# Patient Record
Sex: MALE | Race: BLACK OR AFRICAN AMERICAN | NOT HISPANIC OR LATINO | Employment: OTHER | URBAN - METROPOLITAN AREA
[De-identification: names, ages, dates, MRNs, and addresses within clinical notes are randomized per-mention and may not be internally consistent; named-entity substitution may affect disease eponyms.]

---

## 2017-05-14 ENCOUNTER — HOSPITAL ENCOUNTER (EMERGENCY)
Facility: CLINIC | Age: 37
Discharge: HOME | End: 2017-05-14
Attending: FAMILY MEDICINE | Admitting: FAMILY MEDICINE
Payer: COMMERCIAL

## 2017-05-14 VITALS
OXYGEN SATURATION: 97 % | TEMPERATURE: 98.4 F | SYSTOLIC BLOOD PRESSURE: 128 MMHG | RESPIRATION RATE: 16 BRPM | DIASTOLIC BLOOD PRESSURE: 87 MMHG

## 2017-05-14 DIAGNOSIS — K08.89 ODONTALGIA: ICD-10-CM

## 2017-05-14 PROCEDURE — 99283 EMERGENCY DEPT VISIT LOW MDM: CPT | Mod: Z6 | Performed by: FAMILY MEDICINE

## 2017-05-14 PROCEDURE — 99283 EMERGENCY DEPT VISIT LOW MDM: CPT | Performed by: FAMILY MEDICINE

## 2017-05-14 PROCEDURE — 25000132 ZZH RX MED GY IP 250 OP 250 PS 637: Performed by: FAMILY MEDICINE

## 2017-05-14 RX ORDER — IBUPROFEN 800 MG/1
800 TABLET, FILM COATED ORAL EVERY 8 HOURS PRN
Qty: 20 TABLET | Refills: 0 | Status: SHIPPED | OUTPATIENT
Start: 2017-05-14 | End: 2017-05-22

## 2017-05-14 RX ORDER — PENICILLIN V POTASSIUM 500 MG/1
500 TABLET, FILM COATED ORAL 4 TIMES DAILY
Qty: 28 TABLET | Refills: 0 | Status: SHIPPED | OUTPATIENT
Start: 2017-05-14 | End: 2017-05-21

## 2017-05-14 RX ORDER — OXYCODONE AND ACETAMINOPHEN 5; 325 MG/1; MG/1
2 TABLET ORAL ONCE
Status: COMPLETED | OUTPATIENT
Start: 2017-05-14 | End: 2017-05-14

## 2017-05-14 RX ADMIN — OXYCODONE HYDROCHLORIDE AND ACETAMINOPHEN 1 TABLET: 5; 325 TABLET ORAL at 11:41

## 2017-05-14 ASSESSMENT — ENCOUNTER SYMPTOMS
ABDOMINAL PAIN: 0
CHILLS: 0
FEVER: 0
SHORTNESS OF BREATH: 0

## 2017-05-14 NOTE — ED AVS SNAPSHOT
[facility], Emergency Department    [address]    [facility] [address]    Phone:  [phone]    Fax:  [phone]                                       Casi Villatoro   MRN: [id number]    Department:  [facility], Emergency Department   Date of Visit:  5/14/2017           After Visit Summary Signature Page     I have received my discharge instructions, and my questions have been answered. I have discussed any challenges I see with this plan with the nurse or doctor.    ..........................................................................................................................................  Patient/Patient Representative Signature      ..........................................................................................................................................  Patient Representative Print Name and Relationship to Patient    ..................................................               ................................................  Date                                            Time    ..........................................................................................................................................  Reviewed by Signature/Title    ...................................................              ..............................................  Date                                                            Time

## 2017-05-14 NOTE — ED AVS SNAPSHOT
[facility], Emergency Department    [address]    [place] [address]    Phone:  [phone]    Fax:  [phone]                                       Casi Villatoro   MRN: [id number]    Department:  [facility], Emergency Department   Date of Visit:  5/14/2017           Patient Information     Date Of Birth          1980        Your diagnoses for this visit were:     Odontalgia        You were seen by Juanpablo Devries MD.        Discharge Instructions       Thank you for choosing [facility].     Please closely monitor for further symptoms. Return to the Emergency Department if you develop any new or worsening signs or symptoms.    If you received any opiate pain medications or sedatives during your visit, please do not drive for at least 8 hours.     Labs, cultures or final xray interpretations may still need to be reviewed.  We will call you if your plan of care needs to be changed.    Please make an appointment to follow up with [facility], [phone] or [facility] (phone: [phone]) in 1-2 days even if entirely better.      Discharge References/Attachments     DENTAL PAIN (ENGLISH)      24 Hour Appointment Hotline       To make an appointment at any [facility] clinic, call [phone] ([phone]). If you don't have a family doctor or clinic, we will help you find one. [facility] clinics are conveniently located to serve the needs of you and your family.             Review of your medicines      START taking        Dose / Directions Last dose taken    ibuprofen 800 MG tablet   Commonly known as:  ADVIL/MOTRIN   Dose:  800 mg   Quantity:  20 tablet        Take 1 tablet (800 mg) by mouth every 8 hours as needed for moderate pain   Refills:  0        penicillin V potassium 500 MG tablet   Commonly known as:  VEETID   Dose:  500 mg   Quantity:  28 tablet        Take 1 tablet (500 mg) by mouth 4 times daily for 7 days   Refills:  0         "  Our records show that you are taking the medicines listed below. If these are incorrect, please call your family doctor or clinic.        Dose / Directions Last dose taken    NO ACTIVE MEDICATIONS        Refills:  0                Prescriptions were sent or printed at these locations (2 Prescriptions)                   Other Prescriptions                Printed at Department/Unit printer (2 of 2)         ibuprofen (ADVIL/MOTRIN) 800 MG tablet               penicillin V potassium (VEETID) 500 MG tablet                Orders Needing Specimen Collection     None      Pending Results     No orders found from 5/12/2017 to 5/15/2017.            Pending Culture Results     No orders found from 5/12/2017 to 5/15/2017.            Pending Results Instructions     If you had any lab results that were not finalized at the time of your Discharge, you can call the ED Lab Result RN at [phone]. You will be contacted by this team for any positive Lab results or changes in treatment. The nurses are available 7 days a week from 10A to 6:30P.  You can leave a message 24 hours per day and they will return your call.        Thank you for choosing [facility]       Thank you for choosing [facility] for your care. Our goal is always to provide you with excellent care. Hearing back from our patients is one way we can continue to improve our services. Please take a few minutes to complete the written survey that you may receive in the mail after you visit with us. Thank you!        EZMovehart Information     NxThera lets you send messages to your doctor, view your test results, renew your prescriptions, schedule appointments and more. To sign up, go to [email or web address] . Click on \"Log in\" on the left side of the screen, which will take you to the Welcome page. Then click on \"Sign up Now\" on the right side of the page.     You will be asked to enter the access code listed below, as well as some personal information. Please follow the " directions to create your username and password.     Your access code is: [email or web address]  Expires: 2017 11:45 AM     Your access code will  in 90 days. If you need help or a new code, please call your [facility] clinic or [phone].        Care EveryWhere ID     This is your Care EveryWhere ID. This could be used by other organizations to access your [facility] medical records  [id number]        After Visit Summary       This is your record. Keep this with you and show to your community pharmacist(s) and doctor(s) at your next visit.

## 2017-05-14 NOTE — DISCHARGE INSTRUCTIONS
Thank you for choosing [facility].     Please closely monitor for further symptoms. Return to the Emergency Department if you develop any new or worsening signs or symptoms.    If you received any opiate pain medications or sedatives during your visit, please do not drive for at least 8 hours.     Labs, cultures or final xray interpretations may still need to be reviewed.  We will call you if your plan of care needs to be changed.    Please make an appointment to follow up with [facility], [phone] or [facility] (phone: [phone]) in 1-2 days even if entirely better.

## 2017-05-14 NOTE — ED PROVIDER NOTES
History     Chief Complaint   Patient presents with     Dental Pain     Pain in right lower molar for one week.     TYLER Villatoro is a 37 year old male who presents to the ED with complaints of lower right-sided posterior tooth pain. The patient states that this has hurt for about a year, but over the past week this has become much worse. He denies any fever, chills, or trauma to the area.      PAST MEDICAL HISTORY:   Past Medical History:   Diagnosis Date     BMI 31.0-31.9,adult 6/20/2013       PAST SURGICAL HISTORY:   Past Surgical History:   Procedure Laterality Date     NO HISTORY OF SURGERY  2013       FAMILY HISTORY:   Family History   Problem Relation Age of Onset     Cancer - colorectal No family hx of      Prostate Cancer No family hx of        SOCIAL HISTORY:   Social History   Substance Use Topics     Smoking status: Never Smoker     Smokeless tobacco: Never Used     Alcohol use No       Patient's Medications   New Prescriptions    IBUPROFEN (ADVIL/MOTRIN) 800 MG TABLET    Take 1 tablet (800 mg) by mouth every 8 hours as needed for moderate pain    PENICILLIN V POTASSIUM (VEETID) 500 MG TABLET    Take 1 tablet (500 mg) by mouth 4 times daily for 7 days   Previous Medications    NO ACTIVE MEDICATIONS       Modified Medications    No medications on file   Discontinued Medications    IBUPROFEN (ADVIL,MOTRIN) 800 MG TABLET    Take 1 tablet (800 mg) by mouth every 8 hours as needed for moderate pain        No Known Allergies      I have reviewed the Medications, Allergies, Past Medical and Surgical History, and Social History in the Epic system.    Review of Systems   Constitutional: Negative for chills and fever.   HENT: Positive for dental problem (Lower right-sided posterior tooth pain.).    Respiratory: Negative for shortness of breath.    Cardiovascular: Negative for chest pain.   Gastrointestinal: Negative for abdominal pain.   All other systems reviewed and are negative.      Physical Exam   BP:  132/87  Heart Rate: 76  Temp: 98.4  F (36.9  C)  Resp: 16  SpO2: 97 %  Physical Exam   Constitutional: He is oriented to person, place, and time. He appears well-developed and well-nourished. No distress.   HENT:   Head: Normocephalic.   Mouth/Throat: Uvula is midline and mucous membranes are normal. No trismus in the jaw.       Eyes: Pupils are equal, round, and reactive to light.   Neck: Neck supple.   Cardiovascular: Normal rate.    Pulmonary/Chest: Effort normal.   Musculoskeletal: Normal range of motion.   Lymphadenopathy:     He has no cervical adenopathy.   Neurological: He is alert and oriented to person, place, and time.       ED Course     ED Course     Procedures             Critical Care time:  none               Labs Ordered and Resulted from Time of ED Arrival Up to the Time of Departure from the ED - No data to display         Assessments & Plan (with Medical Decision Making)   Patient presenting with acute right lower dental pain.  He has no obvious signs of abscess, Dallas's angina, referred pain, tonsillar abscess, or retropharyngeal abscess.  This is likely a periapical infection or an impacted wisdom tooth.  We will treat for acute pain and refer him for expedient dental follow-up.  Discussed expected course, need for follow up, and indications for return with the patient.  See discharge instructions.      I have reviewed the nursing notes.    I have reviewed the findings, diagnosis, plan and need for follow up with the patient.    New Prescriptions    IBUPROFEN (ADVIL/MOTRIN) 800 MG TABLET    Take 1 tablet (800 mg) by mouth every 8 hours as needed for moderate pain    PENICILLIN V POTASSIUM (VEETID) 500 MG TABLET    Take 1 tablet (500 mg) by mouth 4 times daily for 7 days       Final diagnoses:   Odontalgia   Rodrigo MONTEJO, am serving as a trained medical scribe to document services personally performed by Juanpablo Devries MD, based on the provider's statements to me.      Juanpablo MONTEJO  MD Jaycob, was physically present and have reviewed and verified the accuracy of this note documented by Rodrigo Mosqueda.       5/14/2017   [facility], [place], EMERGENCY DEPARTMENT     Juanpablo Devries MD  05/14/17 1131

## 2018-04-17 ENCOUNTER — OFFICE VISIT (OUTPATIENT)
Dept: FAMILY MEDICINE | Facility: CLINIC | Age: 38
End: 2018-04-17
Payer: COMMERCIAL

## 2018-04-17 VITALS
SYSTOLIC BLOOD PRESSURE: 120 MMHG | BODY MASS INDEX: 30.56 KG/M2 | TEMPERATURE: 97.2 F | HEART RATE: 77 BPM | OXYGEN SATURATION: 97 % | HEIGHT: 64 IN | WEIGHT: 179 LBS | DIASTOLIC BLOOD PRESSURE: 78 MMHG

## 2018-04-17 DIAGNOSIS — R68.89 SENSATION OF SWOLLEN THROAT: Primary | ICD-10-CM

## 2018-04-17 DIAGNOSIS — R06.2 WHEEZING: ICD-10-CM

## 2018-04-17 PROCEDURE — 99214 OFFICE O/P EST MOD 30 MIN: CPT | Performed by: NURSE PRACTITIONER

## 2018-04-17 RX ORDER — CETIRIZINE HYDROCHLORIDE 10 MG/1
10 TABLET ORAL EVERY EVENING
Qty: 30 TABLET | Refills: 1 | Status: SHIPPED | OUTPATIENT
Start: 2018-04-17 | End: 2019-06-26

## 2018-04-17 NOTE — PROGRESS NOTES
"  SUBJECTIVE:   Casi Villatoro is a 38 year old male who presents to clinic today for the following health issues:    Trouble breathing when sleeping  Onset: 3-4 years ago    Description:   Choking feeling when breathing, nasal congestion.     Intensity: severe    Progression of Symptoms:  worsening    Accompanying Signs & Symptoms:  None    Previous history of similar problem:   None    Precipitating factors:   Worsened by: Sleeping, laying flat. Clearing throat    Alleviating factors:  Improved by: Hot water or tea helps     Therapies Tried and outcome: None     Never seen for this before   \"Once it starts feels dryness in throat and just like his neck is too big\"  No recent sicknesses or allergies, no new medicaitons or herbals  Used to snore about a year or two ago but not now. No trouble sleeping at all.   No chest pain or GERD history     \"Feels like sometimes his eyes roll back even when feels like something is stuck in his throat   Then water hurts but helps to open it up\"  No difficulties swallowing food or liquids  Nonsmoker  obese  Estimated body mass index is 30.56 kg/(m^2) as calculated from the following:    Height as of this encounter: 5' 4.17\" (1.63 m).    Weight as of this encounter: 179 lb (81.2 kg).   Drinks a lot of water, runner and even back when he was running very regularly he was always the same size       Problem list and histories reviewed & adjusted, as indicated.  Additional history: as documented    Patient Active Problem List   Diagnosis     CARDIOVASCULAR SCREENING; LDL GOAL LESS THAN 160     Speaks Oromo, requires      BMI 31.0-31.9,adult     Past Surgical History:   Procedure Laterality Date     NO HISTORY OF SURGERY  2013       Social History   Substance Use Topics     Smoking status: Never Smoker     Smokeless tobacco: Never Used     Alcohol use No     Family History   Problem Relation Age of Onset     Cancer - colorectal No family hx of      Prostate Cancer No family hx of " "         Current Outpatient Prescriptions   Medication Sig Dispense Refill     cetirizine (ZYRTEC) 10 MG tablet Take 1 tablet (10 mg) by mouth every evening 30 tablet 1     NO ACTIVE MEDICATIONS        No Known Allergies  No lab results found.   BP Readings from Last 3 Encounters:   04/17/18 120/78   05/14/17 128/87   08/06/15 123/73    Wt Readings from Last 3 Encounters:   04/17/18 179 lb (81.2 kg)   08/05/15 180 lb (81.6 kg)   06/30/14 178 lb 1.6 oz (80.8 kg)                  Labs reviewed in EPIC    Reviewed and updated as needed this visit by clinical staff       Reviewed and updated as needed this visit by Provider          ROS:  Constitutional, HEENT, cardiovascular, pulmonary, GI, , musculoskeletal, neuro, skin, endocrine and psych systems are negative, except as otherwise noted.    OBJECTIVE:     /78  Pulse 77  Temp 97.2  F (36.2  C)  Ht 5' 4.17\" (1.63 m)  Wt 179 lb (81.2 kg)  SpO2 97%  BMI 30.56 kg/m2  Body mass index is 30.56 kg/(m^2).   EXAM:   GENERAL: healthy, alert and no distress  EYES: Eyes grossly normal to inspection, PERRL and conjunctivae and sclerae normal  HEENT: ear canals and TM's normal, nose and mouth without ulcers or lesions, pharynx clear without erythema, No tonsillar hypertrophy and mucous membranes moist.   NECK: no adenopathy, no asymmetry although neck diameter feels increased, no masses, or scars and thyroid normal to palpation  RESP: lungs with very slight wheezes intermittent noticed in lower lobes, otherwise good airflow, no cough, no rhinorrhea    CV: regular rate and rhythm, normal S1 S2, no S3 or S4, no murmur, click or rub, no peripheral edema and peripheral pulses strong  ABDOMEN: soft, nontender, no hepatosplenomegaly, no masses and bowel sounds normal  MS: no gross musculoskeletal defects noted, no edema  SKIN: no suspicious lesions or rashes  NEURO: Normal strength and tone, mentation intact and speech normal  PSYCH: mentation appears normal, affect " normal/bright    Diagnostic Test Results:  none     ASSESSMENT/PLAN:         ICD-10-CM    1. Sensation of swollen throat R68.89 OTOLARYNGOLOGY REFERRAL     cetirizine (ZYRTEC) 10 MG tablet     OTOLARYNGOLOGY REFERRAL   2. Wheezing R06.2 cetirizine (ZYRTEC) 10 MG tablet   3. BMI 30.0-30.9,adult Z68.30    discussed differentials, symptoms concerning for anaphylaxis however pt feels this is not happening and this has been going on frequently for 3-4 years. Pt stable on exam today and breathing easily.   No recent illnesses or allergies, no GERD symptoms, does report some pnd and secretions at times so did recommend we trial daily zyrtec to see if this helps. Could also try OTC Tums or zantac next and he will consider. Also could consider upper GI to check for any hiatal hernia or other concern with esophagus. Pt does not drink or smoke, denies any problems swallowing solids or liquids. Also considered Spirometry but will not do since no chest tightness or exercise intolerance. If wheezing at next visit will do this testing.   Considered sleep study, pt feels his sleep is not the problem, no snoring so will send to ENT first to check for any structural abnormalities. Discussed symptoms and 911 if any concerns about breathing when this happens to him. Monitor for what brings it on and bring that info to follow up appointments     Due for health maintenance exam with fasting lab work so can follow up at that time. Return to Clinic earlier prn     Patient Instructions   Please schedule with ENT to check your throat further.     Please try daily zyrtec     As we discussed, please go to ER or call 911 if any difficulty breathing as we discussed.     Please schedule your health maintenance exam with fasting lab work (don't eat or drink anything for 8-10 hours before the appointment       NIKKO Anders CNP  [facility]

## 2018-04-17 NOTE — MR AVS SNAPSHOT
After Visit Summary   4/17/2018    Casi Villatoro    MRN: [id number]           Patient Information     Date Of Birth          1980        Visit Information        Provider Department      4/17/2018 12:45 PM Twila Stewart APRN CNP; [facility]        Today's Diagnoses     Sensation of swollen throat    -  1    Wheezing          Care Instructions    Please schedule with ENT to check your throat further.     Please try daily zyrtec     As we discussed, please go to ER or call 911 if any difficulty breathing as we discussed.     Please schedule your health maintenance exam with fasting lab work (don't eat or drink anything for 8-10 hours before the appointment           Follow-ups after your visit        Additional Services     OTOLARYNGOLOGY REFERRAL       Your provider has referred you to: [facility]: [facility] (Otolaryngology) [facility] [phone]  [email or web address]    Sensation of throat swelling intermittently for the past 3-4 years but no allergy or ill symptoms, no snoring recently.      Please be aware that coverage of these services is subject to the terms and limitations of your health insurance plan.  Call member services at your health plan with any benefit or coverage questions.      Please bring the following with you to your appointment:    (1) Any X-Rays, CTs or MRIs which have been performed.  Contact the facility where they were done to arrange for  prior to your scheduled appointment.   (2) List of current medications  (3) This referral request   (4) Any documents/labs given to you for this referral            OTOLARYNGOLOGY REFERRAL       Your provider has referred you to: [facility] [phone]   [email or web address]    Sensation of throat swelling intermittently for the past 3-4 years but no allergy or ill symptoms, no snoring  "recently.     Please be aware that coverage of these services is subject to the terms and limitations of your health insurance plan.  Call member services at your health plan with any benefit or coverage questions.      Please bring the following with you to your appointment:    (1) Any X-Rays, CTs or MRIs which have been performed.  Contact the facility where they were done to arrange for  prior to your scheduled appointment.   (2) List of current medications  (3) This referral request   (4) Any documents/labs given to you for this referral                  Who to contact     If you have questions or need follow up information about today's clinic visit or your schedule please contact [facility] directly at [phone].  Normal or non-critical lab and imaging results will be communicated to you by MyChart, letter or phone within 4 business days after the clinic has received the results. If you do not hear from us within 7 days, please contact the clinic through AllDigitalhart or phone. If you have a critical or abnormal lab result, we will notify you by phone as soon as possible.  Submit refill requests through Dayforce or call your pharmacy and they will forward the refill request to us. Please allow 3 business days for your refill to be completed.          Additional Information About Your Visit        AllDigitalharIntroNiche Information     Dayforce lets you send messages to your doctor, view your test results, renew your prescriptions, schedule appointments and more. To sign up, go to [email or web address] . Click on \"Log in\" on the left side of the screen, which will take you to the Welcome page. Then click on \"Sign up Now\" on the right side of the page.     You will be asked to enter the access code listed below, as well as some personal information. Please follow the directions to create your username and password.     Your access code is: [email or web address]  Expires: 5/10/2018 11:53 AM     Your access code will " " in 90 days. If you need help or a new code, please call your [facility] clinic or [phone].        Care EveryWhere ID     This is your Care EveryWhere ID. This could be used by other organizations to access your [facility] medical records  [id number]        Your Vitals Were     Pulse Temperature Height Pulse Oximetry BMI (Body Mass Index)       77 97.2  F (36.2  C) 5' 4.17\" (1.63 m) 97% 30.56 kg/m2        Blood Pressure from Last 3 Encounters:   18 120/78   17 128/87   08/06/15 123/73    Weight from Last 3 Encounters:   18 179 lb (81.2 kg)   08/05/15 180 lb (81.6 kg)   14 178 lb 1.6 oz (80.8 kg)              We Performed the Following     OTOLARYNGOLOGY REFERRAL     OTOLARYNGOLOGY REFERRAL          Today's Medication Changes          These changes are accurate as of 18  2:12 PM.  If you have any questions, ask your nurse or doctor.               Start taking these medicines.        Dose/Directions    cetirizine 10 MG tablet   Commonly known as:  zyrTEC   Used for:  Sensation of swollen throat, Wheezing        Dose:  10 mg   Take 1 tablet (10 mg) by mouth every evening   Quantity:  30 tablet   Refills:  1            Where to get your medicines      These medications were sent to [facility] [place], [place] - [address]  [address], [facility] [address]     Phone:  [phone]     cetirizine 10 MG tablet                Primary Care Provider Office Phone # Fax #    Rafa Dhillon [phone]       [address]  [facility] [address]        Equal Access to Services     ROSALINO VILLARREAL AH: Teresita Hobson, braulio bar, thom rousseau, jesika talbot. So [facility] [phone].    ATENCIÓN: Si habla español, tiene a montes disposición servicios gratuitos de asistencia lingüística. Llame al [phone].    We comply with applicable federal civil rights laws and [place] " laws. We do not discriminate on the basis of race, color, national origin, age, disability, sex, sexual orientation, or gender identity.            Thank you!     Thank you for choosing [facility]  for your care. Our goal is always to provide you with excellent care. Hearing back from our patients is one way we can continue to improve our services. Please take a few minutes to complete the written survey that you may receive in the mail after your visit with us. Thank you!             Your Updated Medication List - Protect others around you: Learn how to safely use, store and throw away your medicines at www.disposemymeds.org.          This list is accurate as of 4/17/18  2:12 PM.  Always use your most recent med list.                   Brand Name Dispense Instructions for use Diagnosis    cetirizine 10 MG tablet    zyrTEC    30 tablet    Take 1 tablet (10 mg) by mouth every evening    Sensation of swollen throat, Wheezing       NO ACTIVE MEDICATIONS

## 2018-04-17 NOTE — PATIENT INSTRUCTIONS
Please schedule with ENT to check your throat further.     Please try daily zyrtec     As we discussed, please go to ER or call 911 if any difficulty breathing as we discussed.     Please schedule your health maintenance exam with fasting lab work (don't eat or drink anything for 8-10 hours before the appointment

## 2018-05-02 ENCOUNTER — OFFICE VISIT (OUTPATIENT)
Dept: FAMILY MEDICINE | Facility: CLINIC | Age: 38
End: 2018-05-02
Payer: COMMERCIAL

## 2018-05-02 VITALS
TEMPERATURE: 98.6 F | OXYGEN SATURATION: 99 % | SYSTOLIC BLOOD PRESSURE: 126 MMHG | HEART RATE: 73 BPM | WEIGHT: 179.4 LBS | DIASTOLIC BLOOD PRESSURE: 80 MMHG | BODY MASS INDEX: 30.63 KG/M2

## 2018-05-02 DIAGNOSIS — Z20.2 POTENTIAL EXPOSURE TO STD: ICD-10-CM

## 2018-05-02 DIAGNOSIS — Z00.00 ROUTINE GENERAL MEDICAL EXAMINATION AT A HEALTH CARE FACILITY: Primary | ICD-10-CM

## 2018-05-02 PROCEDURE — 36415 COLL VENOUS BLD VENIPUNCTURE: CPT | Performed by: FAMILY MEDICINE

## 2018-05-02 PROCEDURE — 87591 N.GONORRHOEAE DNA AMP PROB: CPT | Performed by: FAMILY MEDICINE

## 2018-05-02 PROCEDURE — 80053 COMPREHEN METABOLIC PANEL: CPT | Performed by: FAMILY MEDICINE

## 2018-05-02 PROCEDURE — 86780 TREPONEMA PALLIDUM: CPT | Performed by: FAMILY MEDICINE

## 2018-05-02 PROCEDURE — 80061 LIPID PANEL: CPT | Performed by: FAMILY MEDICINE

## 2018-05-02 PROCEDURE — 87491 CHLMYD TRACH DNA AMP PROBE: CPT | Performed by: FAMILY MEDICINE

## 2018-05-02 PROCEDURE — 99395 PREV VISIT EST AGE 18-39: CPT | Performed by: FAMILY MEDICINE

## 2018-05-02 PROCEDURE — 87389 HIV-1 AG W/HIV-1&-2 AB AG IA: CPT | Performed by: FAMILY MEDICINE

## 2018-05-02 PROCEDURE — 83721 ASSAY OF BLOOD LIPOPROTEIN: CPT | Mod: 59 | Performed by: FAMILY MEDICINE

## 2018-05-02 NOTE — PROGRESS NOTES
SUBJECTIVE:   CC: Casi Villatoro is an 38 year old male who presents for preventative health visit.     Patient says that he is in good health and has no concerns. 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 No problems with sleep or changes in bowel movements.    Healthy Habits:    Do you get at least three servings of calcium containing foods daily (dairy, green leafy vegetables, etc.)? yes    Amount of exercise or daily activities, outside of work: 1 day(s) per week    Problems taking medications regularly No    Medication side effects: No    Have you had an eye exam in the past two years? no    Do you see a dentist twice per year? No, once    Do you have sleep apnea, excessive snoring or daytime drowsiness? no     Today's PHQ-2 Score:   PHQ-2 ( 1999 [employer]) 5/2/2018 6/14/2013   Q1: Little interest or pleasure in doing things 0 0   Q2: Feeling down, depressed or hopeless 0 0   PHQ-2 Score 0 0       Abuse: Current or Past(Physical, Sexual or Emotional)- No  Do you feel safe in your environment - Yes    Social History   Substance Use Topics     Smoking status: Never Smoker     Smokeless tobacco: Never Used     Alcohol use No      If you drink alcohol do you typically have >3 drinks per day or >7 drinks per week? No                      Last PSA: No results found for: PSA    Reviewed orders with patient. Reviewed health maintenance and updated orders accordingly - Yes  Patient Active Problem List   Diagnosis     CARDIOVASCULAR SCREENING; LDL GOAL LESS THAN 160     Speaks [place], requires      BMI 30.0-30.9,adult     Past Surgical History:   Procedure Laterality Date     NO HISTORY OF SURGERY  2013       Social History   Substance Use Topics     Smoking status: Never Smoker     Smokeless tobacco: Never Used     Alcohol use No     Family History   Problem Relation Age of Onset     Cancer - colorectal No family hx of      Prostate Cancer  No family hx of          Current Outpatient Prescriptions   Medication Sig Dispense Refill     cetirizine (ZYRTEC) 10 MG tablet Take 1 tablet (10 mg) by mouth every evening 30 tablet 1     NO ACTIVE MEDICATIONS        No Known Allergies    Reviewed and updated as needed this visit by clinical staff  Tobacco  Allergies  Meds         Reviewed and updated as needed this visit by Provider            ROS:  Denies headache, insomnia, chest pain, shortness of breath, cough, heartburn, bowel issues, bladder issues, neck pain, back pain, hip pain, knee pain, ankle pain, or foot pain. Remainder of ROS is negative unless otherwise noted above or in HPI.    This document serves as a record of the services and decisions personally performed and made by Rafa Dhillon MD. It was created on his behalf by Derrick Gonzales, a trained medical scribe. The creation of this document is based on the provider's statements to the medical scribe.  Derrick Gonzales 2:41 PM May 2, 2018    OBJECTIVE:   /80  Pulse 73  Temp 98.6  F (37  C) (Oral)  Wt 179 lb 6.4 oz (81.4 kg)  SpO2 99%  BMI 30.63 kg/m2  EXAM:  GENERAL: healthy, alert and no distress  EYES: Eyes grossly normal to inspection, PERRL and conjunctivae and sclerae normal. EOMI.  HENT: ear canals and TM's normal, nose and mouth without ulcers or lesions  NECK: no adenopathy, no asymmetry, masses, or scars and thyroid normal to palpation. TMJ normal. No bruits.  RESP: lungs clear to auscultation - no rales, rhonchi or wheezes  CV: regular rate and rhythm, normal S1 S2, no S3 or S4, no murmur, click or rub, no peripheral edema and peripheral pulses strong  ABDOMEN: soft, nontender, no hepatosplenomegaly, no masses and bowel sounds normal   (male): normal male genitalia without lesions or urethral discharge, no hernia  RECTAL: normal sphincter tone, no rectal masses, prostate normal size, smooth, nontender without nodules or masses  MS: no gross musculoskeletal defects  noted, no edema. Calves nontender.  SKIN: no suspicious lesions or rashes  NEURO: Normal strength and tone, mentation intact and speech normal. Knee reflexes normal. No tremors.  PSYCH: mentation appears normal, affect normal/bright  LYMPH: no cervical, supraclavicular, axillary, or inguinal adenopathy    ASSESSMENT/PLAN:   (Z00.00) Routine general medical examination at a health care facility  (primary encounter diagnosis)  Comment: Routine physical and labs completed today.  Plan: Comprehensive metabolic panel, Lipid panel         reflex to direct LDL Fasting        Follow up with results from lab.    (Z68.30) BMI 30.0-30.9,adult  Comment: Unchanged since last visit. Encouraged regular exercise and eating a healthy diet.  Plan: Will continue to monitor. Follow up as needed.    (Z20.2) Potential exposure to STD  Comment: Labs completed today.  Plan: HIV Antigen Antibody Combo, NEISSERIA         GONORRHOEA PCR, CHLAMYDIA TRACHOMATIS PCR,         Treponema Abs w Reflex to RPR and Titer        Follow up with results from lab.    Patient Instructions     Preventive Health Recommendations  Male Ages 26 - 39    Yearly exam:             See your health care provider every year in order to  o   Review health changes.   o   Discuss preventive care.    o   Review your medicines if your doctor has prescribed any.    You should be tested each year for STDs (sexually transmitted diseases), if you re at risk.     After age 35, talk to your provider about cholesterol testing. If you are at risk for heart disease, have your cholesterol tested at least every 5 years.     If you are at risk for diabetes, you should have a diabetes test (fasting glucose).  Shots: Get a flu shot each year. Get a tetanus shot every 10 years.     Nutrition:    Eat at least 5 servings of fruits and vegetables daily.     Eat whole-grain bread, whole-wheat pasta and brown rice instead of white grains and rice.     Talk to your provider about Calcium and  "Vitamin D.     Lifestyle    Exercise for at least 150 minutes a week (30 minutes a day, 5 days a week). This will help you control your weight and prevent disease.     Limit alcohol to one drink per day.     No smoking.     Wear sunscreen to prevent skin cancer.     See your dentist every six months for an exam and cleaning.         COUNSELING:  Reviewed preventive health counseling, as reflected in patient instructions     reports that he has never smoked. He has never used smokeless tobacco.  Estimated body mass index is 30.63 kg/(m^2) as calculated from the following:    Height as of 4/17/18: 5' 4.17\" (1.63 m).    Weight as of this encounter: 179 lb 6.4 oz (81.4 kg).   Weight management plan: Encouraged regular exercise and eating a healthy diet.    The information in this document, created by the medical scribe for me, accurately reflects the services I personally performed and the decisions made by me. I have reviewed and approved this document for accuracy prior to leaving the patient care area.   Rafa Dhillon MD 2:41 PM May 2, 2018  [facility]  "

## 2018-05-02 NOTE — MR AVS SNAPSHOT
After Visit Summary   5/2/2018    Casi Villatoro    MRN: [id number]           Patient Information     Date Of Birth          1980        Visit Information        Provider Department      5/2/2018 2:15 PM Rafa Dhillon MD; [employer] [facility]        Today's Diagnoses     Routine general medical examination at a health care facility    -  1    BMI 30.0-30.9,adult        Potential exposure to STD          Care Instructions      Preventive Health Recommendations  Male Ages 26 - 39    Yearly exam:             See your health care provider every year in order to  o   Review health changes.   o   Discuss preventive care.    o   Review your medicines if your doctor has prescribed any.    You should be tested each year for STDs (sexually transmitted diseases), if you re at risk.     After age 35, talk to your provider about cholesterol testing. If you are at risk for heart disease, have your cholesterol tested at least every 5 years.     If you are at risk for diabetes, you should have a diabetes test (fasting glucose).  Shots: Get a flu shot each year. Get a tetanus shot every 10 years.     Nutrition:    Eat at least 5 servings of fruits and vegetables daily.     Eat whole-grain bread, whole-wheat pasta and brown rice instead of white grains and rice.     Talk to your provider about Calcium and Vitamin D.     Lifestyle    Exercise for at least 150 minutes a week (30 minutes a day, 5 days a week). This will help you control your weight and prevent disease.     Limit alcohol to one drink per day.     No smoking.     Wear sunscreen to prevent skin cancer.     See your dentist every six months for an exam and cleaning.             Follow-ups after your visit        Who to contact     If you have questions or need follow up information about today's clinic visit or your schedule please contact [facility] directly at [phone].  Normal or non-critical lab and  "imaging results will be communicated to you by MyChart, letter or phone within 4 business days after the clinic has received the results. If you do not hear from us within 7 days, please contact the clinic through [employer] or phone. If you have a critical or abnormal lab result, we will notify you by phone as soon as possible.  Submit refill requests through [employer] or call your pharmacy and they will forward the refill request to us. Please allow 3 business days for your refill to be completed.          Additional Information About Your Visit        [employer] Information     [employer] lets you send messages to your doctor, view your test results, renew your prescriptions, schedule appointments and more. To sign up, go to [email or web address] . Click on \"Log in\" on the left side of the screen, which will take you to the Welcome page. Then click on \"Sign up Now\" on the right side of the page.     You will be asked to enter the access code listed below, as well as some personal information. Please follow the directions to create your username and password.     Your access code is: [email or web address]  Expires: 5/10/2018 11:53 AM     Your access code will  in 90 days. If you need help or a new code, please call your [facility] clinic or [phone].        Care EveryWhere ID     This is your Care EveryWhere ID. This could be used by other organizations to access your [facility] medical records  [id number]        Your Vitals Were     Pulse Temperature Pulse Oximetry BMI (Body Mass Index)          73 98.6  F (37  C) (Oral) 99% 30.63 kg/m2         Blood Pressure from Last 3 Encounters:   18 126/80   18 120/78   17 128/87    Weight from Last 3 Encounters:   18 179 lb 6.4 oz (81.4 kg)   18 179 lb (81.2 kg)   08/05/15 180 lb (81.6 kg)              We Performed the Following     CHLAMYDIA TRACHOMATIS PCR     Comprehensive metabolic panel     HIV Antigen Antibody Combo     Lipid panel reflex to direct " LDL Fasting     NEISSERIA GONORRHOEA PCR     Treponema Abs w Reflex to RPR and Titer        Primary Care Provider Office Phone # Fax #    Rafa Dhillon [phone]       [address] [address]        Equal Access to Services     [facility] Center[facility] : Hadii aad ku hadasho Soomaali, waaxda luqadaha, qaybta kaalmada adeegyada, waxay josein hayaan adejeanette khailynmiguel laeladio . So [facility] [phone].    ATENCIÓN: Si habla español, tiene a montes disposición servicios gratuitos de asistencia lingüística. Diana al [phone].    We comply with applicable federal civil rights laws and [place] laws. We do not discriminate on the basis of race, color, national origin, age, disability, sex, sexual orientation, or gender identity.            Thank you!     Thank you for choosing [facility]  for your care. Our goal is always to provide you with excellent care. Hearing back from our patients is one way we can continue to improve our services. Please take a few minutes to complete the written survey that you may receive in the mail after your visit with us. Thank you!             Your Updated Medication List - Protect others around you: Learn how to safely use, store and throw away your medicines at www.disposemymeds.org.          This list is accurate as of 5/2/18  3:41 PM.  Always use your most recent med list.                   Brand Name Dispense Instructions for use Diagnosis    cetirizine 10 MG tablet    zyrTEC    30 tablet    Take 1 tablet (10 mg) by mouth every evening    Sensation of swollen throat, Wheezing       NO ACTIVE MEDICATIONS

## 2018-05-02 NOTE — LETTER
May 7, 2018      Casi Villatoro  [address]   [facility] [address]        Dear ,    We are writing to inform you of your test results.    Your test results fall within the expected range(s) or remain unchanged from previous results.  Please continue with current treatment plan.    Resulted Orders   Comprehensive metabolic panel   Result Value Ref Range    Sodium 139 133 - 144 mmol/L    Potassium 3.8 3.4 - 5.3 mmol/L    Chloride 104 94 - 109 mmol/L    Carbon Dioxide 29 20 - 32 mmol/L    Anion Gap 6 3 - 14 mmol/L    Glucose 90 70 - 99 mg/dL      Comment:      Non Fasting    Urea Nitrogen 15 7 - 30 mg/dL    Creatinine 0.70 0.66 - 1.25 mg/dL    GFR Estimate >90 >60 mL/min/1.7m2      Comment:      Non  GFR Calc    GFR Estimate If Black >90 >60 mL/min/1.7m2      Comment:       GFR Calc    Calcium 8.8 8.5 - 10.1 mg/dL    Bilirubin Total 0.5 0.2 - 1.3 mg/dL    Albumin 4.1 3.4 - 5.0 g/dL    Protein Total 7.8 6.8 - 8.8 g/dL    Alkaline Phosphatase 52 40 - 150 U/L    ALT 52 0 - 70 U/L    AST 25 0 - 45 U/L   Lipid panel reflex to direct LDL Fasting   Result Value Ref Range    Cholesterol 151 <200 mg/dL    Triglycerides 764 (H) <150 mg/dL      Comment:      Borderline high:  150-199 mg/dl  High:             200-499 mg/dl  Very high:       >499 mg/dl  Non Fasting      HDL Cholesterol 23 (L) >39 mg/dL    LDL Cholesterol Calculated  <100 mg/dL     Cannot estimate LDL when triglyceride exceeds 400 mg/dL    Non HDL Cholesterol 128 <130 mg/dL   HIV Antigen Antibody Combo   Result Value Ref Range    HIV Antigen Antibody Combo Nonreactive NR^Nonreactive          Comment:      HIV-1 p24 Ag & HIV-1/HIV-2 Ab Not Detected   NEISSERIA GONORRHOEA PCR   Result Value Ref Range    Specimen Descrip Urine     N Gonorrhea PCR Negative NEG^Negative      Comment:      Negative for N. gonorrhoeae rRNA by transcription mediated amplification.  A negative result by transcription mediated  amplification does not preclude   the presence of N. gonorrhoeae infection because results are dependent on   proper and adequate collection, absence of inhibitors, and sufficient rRNA to   be detected.     CHLAMYDIA TRACHOMATIS PCR   Result Value Ref Range    Specimen Description Urine     Chlamydia Trachomatis PCR Negative NEG^Negative      Comment:      Negative for C. trachomatis rRNA by transcription mediated amplification.  A negative result by transcription mediated amplification does not preclude   the presence of C. trachomatis infection because results are dependent on   proper and adequate collection, absence of inhibitors, and sufficient rRNA to   be detected.     Treponema Abs w Reflex to RPR and Titer   Result Value Ref Range    Treponema Antibodies Nonreactive NR^Nonreactive   LDL cholesterol direct   Result Value Ref Range    LDL Cholesterol Direct 65 <100 mg/dL      Comment:      Desirable:       <100 mg/dl       If you have any questions or concerns, please call the clinic at the number listed above.       Sincerely,        Rafa Dhillon MD

## 2018-05-03 LAB
ALBUMIN SERPL-MCNC: 4.1 G/DL (ref 3.4–5)
ALP SERPL-CCNC: 52 U/L (ref 40–150)
ALT SERPL W P-5'-P-CCNC: 52 U/L (ref 0–70)
ANION GAP SERPL CALCULATED.3IONS-SCNC: 6 MMOL/L (ref 3–14)
AST SERPL W P-5'-P-CCNC: 25 U/L (ref 0–45)
BILIRUB SERPL-MCNC: 0.5 MG/DL (ref 0.2–1.3)
BUN SERPL-MCNC: 15 MG/DL (ref 7–30)
C TRACH DNA SPEC QL NAA+PROBE: NEGATIVE
CALCIUM SERPL-MCNC: 8.8 MG/DL (ref 8.5–10.1)
CHLORIDE SERPL-SCNC: 104 MMOL/L (ref 94–109)
CHOLEST SERPL-MCNC: 151 MG/DL
CO2 SERPL-SCNC: 29 MMOL/L (ref 20–32)
CREAT SERPL-MCNC: 0.7 MG/DL (ref 0.66–1.25)
GFR SERPL CREATININE-BSD FRML MDRD: >90 ML/MIN/1.7M2
GLUCOSE SERPL-MCNC: 90 MG/DL (ref 70–99)
HDLC SERPL-MCNC: 23 MG/DL
HIV 1+2 AB+HIV1 P24 AG SERPL QL IA: NONREACTIVE
LDLC SERPL CALC-MCNC: ABNORMAL MG/DL
LDLC SERPL DIRECT ASSAY-MCNC: 65 MG/DL
N GONORRHOEA DNA SPEC QL NAA+PROBE: NEGATIVE
NONHDLC SERPL-MCNC: 128 MG/DL
POTASSIUM SERPL-SCNC: 3.8 MMOL/L (ref 3.4–5.3)
PROT SERPL-MCNC: 7.8 G/DL (ref 6.8–8.8)
SODIUM SERPL-SCNC: 139 MMOL/L (ref 133–144)
SPECIMEN SOURCE: NORMAL
SPECIMEN SOURCE: NORMAL
T PALLIDUM AB SER QL: NONREACTIVE
TRIGL SERPL-MCNC: 764 MG/DL

## 2018-05-03 NOTE — PROGRESS NOTES
Normal (nonfasting) results, please notify patient-send copy of labs and letter. Thanks Rafa Dhillon MD

## 2018-05-10 ENCOUNTER — TELEPHONE (OUTPATIENT)
Dept: FAMILY MEDICINE | Facility: CLINIC | Age: 38
End: 2018-05-10

## 2018-05-10 NOTE — TELEPHONE ENCOUNTER
Reason for call:  Other   Patient called regarding (reason for call): call back  Additional comments: Pt received a letter about his lab results from 5/2/18, but had some further questions regarding the HIV results and what exactly they meant.    Phone number to reach patient:  Home number on file [phone] (home)    Best Time:  Any    Can we leave a detailed message on this number?  YES

## 2018-07-19 NOTE — PROGRESS NOTES
SUBJECTIVE:   Casi Villatoro is a 38 year old male who presents to clinic today for the following health issues:    Growth on Neck  Onset: about 7 months ago and comes and goes every week    Description:   Location: left side of neck   Character: round, flakey, red  Itching (Pruritis): YES    Progression of Symptoms:  worsening    Accompanying Signs & Symptoms:  Skin peels  Fever: no   Body aches or joint pain: no   Sore throat symptoms: no   Recent cold symptoms: no     History:   Previous similar rash: YES    Precipitating factors:   Exposure to similar rash: no   New exposures: None   Recent travel: no     Alleviating factors:  none    Therapies Tried and outcome: none  [place]   Uses electric razor    Problem list and histories reviewed & adjusted, as indicated.  Additional history: as documented    Reviewed and updated as needed this visit by clinical staff       Reviewed and updated as needed this visit by Provider         ROS:    ROS:5 point ROS including CONST, HEENT, Respiratory, CV, and GI other than that noted in the HPI,  is negative       OBJECTIVE:     /82  Pulse 60  Temp 98.2  F (36.8  C) (Oral)  Wt 180 lb (81.6 kg)  SpO2 97%  BMI 30.73 kg/m2  Body mass index is 30.73 kg/(m^2).  GENERAL: healthy, alert and no distress  NECK: no adenopathy, no asymmetry, masses, or scars and thyroid normal to palpation  SKIN: left side anterior neck has a few scattered erythematous papules; one scarred area consistent with previous larger papule    Diagnostic Test Results:  none     ASSESSMENT/PLAN:     (L73.9) Folliculitis  (primary encounter diagnosis)  Comment:   Plan: mupirocin (BACTROBAN) 2 % ointment              See Patient Instructions    NIKKO [facility]

## 2018-07-20 ENCOUNTER — OFFICE VISIT (OUTPATIENT)
Dept: FAMILY MEDICINE | Facility: CLINIC | Age: 38
End: 2018-07-20
Payer: COMMERCIAL

## 2018-07-20 VITALS
HEART RATE: 60 BPM | OXYGEN SATURATION: 97 % | WEIGHT: 180 LBS | TEMPERATURE: 98.2 F | SYSTOLIC BLOOD PRESSURE: 128 MMHG | DIASTOLIC BLOOD PRESSURE: 82 MMHG | BODY MASS INDEX: 30.73 KG/M2

## 2018-07-20 DIAGNOSIS — L73.9 FOLLICULITIS: Primary | ICD-10-CM

## 2018-07-20 PROCEDURE — 99213 OFFICE O/P EST LOW 20 MIN: CPT | Performed by: NURSE PRACTITIONER

## 2018-07-20 PROCEDURE — T1013 SIGN LANG/ORAL INTERPRETER: HCPCS | Mod: U3 | Performed by: NURSE PRACTITIONER

## 2018-07-20 PROCEDURE — 99207 ZZC RSCC CODE FOR CODING REVIEW: CPT | Performed by: NURSE PRACTITIONER

## 2018-07-20 RX ORDER — MUPIROCIN 20 MG/G
OINTMENT TOPICAL 3 TIMES DAILY
Qty: 22 G | Refills: 1 | Status: SHIPPED | OUTPATIENT
Start: 2018-07-20 | End: 2018-07-25

## 2018-07-20 NOTE — MR AVS SNAPSHOT
After Visit Summary   7/20/2018    Casi Villatoro    MRN: [id number]           Patient Information     Date Of Birth          1980        Visit Information        Provider Department      7/20/2018 8:00 AM Indiana Bergeron; Opal Locke APRN [facility]        Today's Diagnoses     Folliculitis    -  1      Care Instructions    Use antibiotic ointment three times a day for two weeks  I want the rash to completely resolve      Folliculitis  Folliculitis is an inflammation of a hair follicle. A hair follicle is the little pocket where a hair grows out of the skin. Bacteria normally live on the skin. But sometimes bacteria can get trapped in a follicle and cause infection. This causes a bumpy rash. The area over the follicles is red and raised. It may itch or be painful. The bumps may have fluid (pus) inside. The pus may leak and then form crusts. Sores can spread to other areas of the body. Once it goes away, folliculitis can come back at any time. Severe cases may cause permanent hair loss and scarring.  Folliculitis can happen anywhere on the body where hair grows. It can be caused by rubbing from tight clothing. Ingrown hairs can cause it. Soaking in a hot tub or swimming pool that has bacteria in the water can cause it. It may also occur if a hair follicle is blocked by a bandage.  Sores often go away in a few days with no treatment. In some cases, medicine may be given. A small piece of skin or pus may be taken to find the type of bacteria causing the infection.  Home care  The healthcare provider may prescribe an antibiotic cream or ointment.  Oral antibiotics may also be prescribed. Or you may be told to use an over-the-counter antibiotic cream. Follow all instructions when using any of these medicines.  General care:    Apply warm, moist compresses to the sores for 20 minutes up to 3 times a day. You can make a compress by soaking a cloth in warm water. Squeeze out excess  water.    Don t cut, poke, or squeeze the sores. This can be painful and spread infection.    Don t scratch the affected area. Scratching can delay healing.    Don t shave the areas affected by folliculitis.    If the sores leak fluid, cover the area with a nonstick gauze bandage. Use as little tape as possible. Carefully discard all soiled bandages.    Dress in loose cotton clothing.    Change sheets and blankets if they are soiled by pus. Wash all clothes, towels, sheets, and cloth diapers in soap and hot water. Do not share clothes, towels, or sheets with other family members.    Do not soak the sores in bath water. This can spread infection. Instead, keep the area clean by gently washing sores with soap and warm water.    Wash your hands or use antibacterial gels often to prevent spreading the bacteria.  Follow-up care  Follow up with your healthcare provider, or as advised.  When to seek medical advice  Call your healthcare provider right away if any of these occur:    Fever of 100.4 F (38 C) or higher    Spreading of the rash    Rash does not get better with treatment    Redness or swelling that gets worse    Rash becomes more painful    Foul-smelling fluid leaking from the skin    Rash improves, but then comes back   Date Last Reviewed: 11/1/2016 2000-2017 [employer]. [address] [place] [address], [place] [id number]. All rights reserved. This information is not intended as a substitute for professional medical care. Always follow your healthcare professional's instructions.                Follow-ups after your visit        Who to contact     If you have questions or need follow up information about today's clinic visit or your schedule please contact [facility] directly at [phone].  Normal or non-critical lab and imaging results will be communicated to you by MyChart, letter or phone within 4 business days after the clinic has received the results. If you do not hear from us  "within 7 days, please contact the clinic through [employer] or phone. If you have a critical or abnormal lab result, we will notify you by phone as soon as possible.  Submit refill requests through [employer] or call your pharmacy and they will forward the refill request to us. Please allow 3 business days for your refill to be completed.          Additional Information About Your Visit        [employer] Information     [employer] lets you send messages to your doctor, view your test results, renew your prescriptions, schedule appointments and more. To sign up, go to [email or web address] . Click on \"Log in\" on the left side of the screen, which will take you to the Welcome page. Then click on \"Sign up Now\" on the right side of the page.     You will be asked to enter the access code listed below, as well as some personal information. Please follow the directions to create your username and password.     Your access code is: [email or web address]  Expires: 10/18/2018  8:26 AM     Your access code will  in 90 days. If you need help or a new code, please call your [place] clinic or [phone].        Care EveryWhere ID     This is your Care EveryWhere ID. This could be used by other organizations to access your [place] medical records  [id number]        Your Vitals Were     Pulse Temperature Pulse Oximetry BMI (Body Mass Index)          60 98.2  F (36.8  C) (Oral) 97% 30.73 kg/m2         Blood Pressure from Last 3 Encounters:   18 128/82   18 126/80   18 120/78    Weight from Last 3 Encounters:   18 180 lb (81.6 kg)   18 179 lb 6.4 oz (81.4 kg)   18 179 lb (81.2 kg)              Today, you had the following     No orders found for display         Today's Medication Changes          These changes are accurate as of 18  8:26 AM.  If you have any questions, ask your nurse or doctor.               Start taking these medicines.        Dose/Directions    mupirocin 2 % ointment   Commonly " known as:  BACTROBAN   Used for:  Folliculitis   Started by:  Opal Locke APRN CNP        Apply topically 3 times daily for 5 days   Quantity:  22 g   Refills:  1            Where to get your medicines      These medications were sent to [facility] - [place], [place] - [address]  [address], [facility] [address]     Phone:  [phone]     mupirocin 2 % ointment                Primary Care Provider Office Phone # Fax #    Rafa Dhillon [phone]       [address]  [facility] [address]        Equal Access to Services     ROSALINO [facility] : Hadii yazmin saul hadasho Sojúniorali, waaxda luqadaha, qaybta kaalmada adeegyada, jesika rendon . So [facility] [phone].    ATENCIÓN: Si habla español, tiene a montes disposición servicios gratuitos de asistencia lingüística. Llame al [phone].    We comply with applicable federal civil rights laws and [place] laws. We do not discriminate on the basis of race, color, national origin, age, disability, sex, sexual orientation, or gender identity.            Thank you!     Thank you for choosing [facility]  for your care. Our goal is always to provide you with excellent care. Hearing back from our patients is one way we can continue to improve our services. Please take a few minutes to complete the written survey that you may receive in the mail after your visit with us. Thank you!             Your Updated Medication List - Protect others around you: Learn how to safely use, store and throw away your medicines at www.disposemymeds.org.          This list is accurate as of 7/20/18  8:26 AM.  Always use your most recent med list.                   Brand Name Dispense Instructions for use Diagnosis    cetirizine 10 MG tablet    zyrTEC    30 tablet    Take 1 tablet (10 mg) by mouth every evening    Sensation of swollen throat, Wheezing       mupirocin 2 % ointment    BACTROBAN     22 g    Apply topically 3 times daily for 5 days    Folliculitis       NO ACTIVE MEDICATIONS

## 2018-07-20 NOTE — PATIENT INSTRUCTIONS
Use antibiotic ointment three times a day for two weeks  I want the rash to completely resolve      Folliculitis  Folliculitis is an inflammation of a hair follicle. A hair follicle is the little pocket where a hair grows out of the skin. Bacteria normally live on the skin. But sometimes bacteria can get trapped in a follicle and cause infection. This causes a bumpy rash. The area over the follicles is red and raised. It may itch or be painful. The bumps may have fluid (pus) inside. The pus may leak and then form crusts. Sores can spread to other areas of the body. Once it goes away, folliculitis can come back at any time. Severe cases may cause permanent hair loss and scarring.  Folliculitis can happen anywhere on the body where hair grows. It can be caused by rubbing from tight clothing. Ingrown hairs can cause it. Soaking in a hot tub or swimming pool that has bacteria in the water can cause it. It may also occur if a hair follicle is blocked by a bandage.  Sores often go away in a few days with no treatment. In some cases, medicine may be given. A small piece of skin or pus may be taken to find the type of bacteria causing the infection.  Home care  The healthcare provider may prescribe an antibiotic cream or ointment.  Oral antibiotics may also be prescribed. Or you may be told to use an over-the-counter antibiotic cream. Follow all instructions when using any of these medicines.  General care:    Apply warm, moist compresses to the sores for 20 minutes up to 3 times a day. You can make a compress by soaking a cloth in warm water. Squeeze out excess water.    Don t cut, poke, or squeeze the sores. This can be painful and spread infection.    Don t scratch the affected area. Scratching can delay healing.    Don t shave the areas affected by folliculitis.    If the sores leak fluid, cover the area with a nonstick gauze bandage. Use as little tape as possible. Carefully discard all soiled bandages.    Dress in  loose cotton clothing.    Change sheets and blankets if they are soiled by pus. Wash all clothes, towels, sheets, and cloth diapers in soap and hot water. Do not share clothes, towels, or sheets with other family members.    Do not soak the sores in bath water. This can spread infection. Instead, keep the area clean by gently washing sores with soap and warm water.    Wash your hands or use antibacterial gels often to prevent spreading the bacteria.  Follow-up care  Follow up with your healthcare provider, or as advised.  When to seek medical advice  Call your healthcare provider right away if any of these occur:    Fever of 100.4 F (38 C) or higher    Spreading of the rash    Rash does not get better with treatment    Redness or swelling that gets worse    Rash becomes more painful    Foul-smelling fluid leaking from the skin    Rash improves, but then comes back   Date Last Reviewed: 11/1/2016 2000-2017 [employer]. [address] [place] [address], [place] [address]. All rights reserved. This information is not intended as a substitute for professional medical care. Always follow your healthcare professional's instructions.

## 2018-09-26 ENCOUNTER — OFFICE VISIT (OUTPATIENT)
Dept: FAMILY MEDICINE | Facility: CLINIC | Age: 38
End: 2018-09-26
Payer: COMMERCIAL

## 2018-09-26 VITALS
BODY MASS INDEX: 31.24 KG/M2 | DIASTOLIC BLOOD PRESSURE: 80 MMHG | OXYGEN SATURATION: 98 % | TEMPERATURE: 98.5 F | SYSTOLIC BLOOD PRESSURE: 133 MMHG | WEIGHT: 183 LBS | HEART RATE: 90 BPM | RESPIRATION RATE: 16 BRPM

## 2018-09-26 DIAGNOSIS — L80 VITILIGO: Primary | ICD-10-CM

## 2018-09-26 PROCEDURE — 99213 OFFICE O/P EST LOW 20 MIN: CPT | Performed by: NURSE PRACTITIONER

## 2018-09-26 RX ORDER — TRIAMCINOLONE ACETONIDE 1 MG/G
CREAM TOPICAL
Qty: 30 G | Refills: 0 | Status: SHIPPED | OUTPATIENT
Start: 2018-09-26 | End: 2019-06-26

## 2018-09-26 NOTE — MR AVS SNAPSHOT
"              After Visit Summary   2018    Casi Villatoro    MRN: [id number]           Patient Information     Date Of Birth          1980        Visit Information        Provider Department      2018 11:30 AM Twila Stewart APRN CNP; [employer] [facility]        Today's Diagnoses     Vitiligo    -  1      Care Instructions    Try the cream twice a day for 2 weeks to see if any improvement, if it doesn't get better in 2 weeks please stop using this.           Follow-ups after your visit        Who to contact     If you have questions or need follow up information about today's clinic visit or your schedule please contact [facility] directly at [phone].  Normal or non-critical lab and imaging results will be communicated to you by MyChart, letter or phone within 4 business days after the clinic has received the results. If you do not hear from us within 7 days, please contact the clinic through [employer] or phone. If you have a critical or abnormal lab result, we will notify you by phone as soon as possible.  Submit refill requests through [employer] or call your pharmacy and they will forward the refill request to us. Please allow 3 business days for your refill to be completed.          Additional Information About Your Visit        MyChart Information     [employer] lets you send messages to your doctor, view your test results, renew your prescriptions, schedule appointments and more. To sign up, go to [email or web address] . Click on \"Log in\" on the left side of the screen, which will take you to the Welcome page. Then click on \"Sign up Now\" on the right side of the page.     You will be asked to enter the access code listed below, as well as some personal information. Please follow the directions to create your username and password.     Your access code is: [email or web address]  Expires: 10/18/2018  8:26 AM     Your access code will  in 90 days. If you " need help or a new code, please call your [place] clinic or [phone].        Care EveryWhere ID     This is your Care EveryWhere ID. This could be used by other organizations to access your [place] medical records  [id number]        Your Vitals Were     Pulse Temperature Respirations Pulse Oximetry BMI (Body Mass Index)       90 98.5  F (36.9  C) (Oral) 16 98% 31.24 kg/m2        Blood Pressure from Last 3 Encounters:   09/26/18 133/80   07/20/18 128/82   05/02/18 126/80    Weight from Last 3 Encounters:   09/26/18 183 lb (83 kg)   07/20/18 180 lb (81.6 kg)   05/02/18 179 lb 6.4 oz (81.4 kg)              Today, you had the following     No orders found for display         Today's Medication Changes          These changes are accurate as of 9/26/18 12:01 PM.  If you have any questions, ask your nurse or doctor.               Start taking these medicines.        Dose/Directions    triamcinolone 0.1 % cream   Commonly known as:  KENALOG   Used for:  Vitiligo   Started by:  Twila Stewart APRN CNP        Apply sparingly to affected area three times daily for 14 days.   Quantity:  30 g   Refills:  0            Where to get your medicines      These medications were sent to [facility] [address]  [address], [facility] [address]     Phone:  [phone]     triamcinolone 0.1 % cream                Primary Care Provider Office Phone # Fax #    Rafa Dhillon [phone]       [address]  [facility] [address]        Equal Access to Services     ROSALINO VILLARREAL : braulio Morris, jesika farah. So [facility] [phone].    ATENCIÓN: Si habla español, tiene a montes disposición servicios gratuitos de asistencia lingüística. Llame al [phone].    We comply with applicable federal civil rights laws and [place] laws. We do not discriminate on the  basis of race, color, national origin, age, disability, sex, sexual orientation, or gender identity.            Thank you!     Thank you for choosing [facility]  for your care. Our goal is always to provide you with excellent care. Hearing back from our patients is one way we can continue to improve our services. Please take a few minutes to complete the written survey that you may receive in the mail after your visit with us. Thank you!             Your Updated Medication List - Protect others around you: Learn how to safely use, store and throw away your medicines at www.disposemymeds.org.          This list is accurate as of 9/26/18 12:01 PM.  Always use your most recent med list.                   Brand Name Dispense Instructions for use Diagnosis    cetirizine 10 MG tablet    zyrTEC    30 tablet    Take 1 tablet (10 mg) by mouth every evening    Sensation of swollen throat, Wheezing       NO ACTIVE MEDICATIONS           triamcinolone 0.1 % cream    KENALOG    30 g    Apply sparingly to affected area three times daily for 14 days.    Vitiligo

## 2018-09-26 NOTE — PATIENT INSTRUCTIONS
Try the cream twice a day for 2 weeks to see if any improvement, if it doesn't get better in 2 weeks please stop using this.

## 2018-09-26 NOTE — PROGRESS NOTES
SUBJECTIVE:   Casi Villatoro is a 38 year old male who presents to clinic today for the following health issues:    Rash  Onset: a while     Description:   Location: neck  Character: white spot, round  Itching (Pruritis): no     Progression of Symptoms:  Worsening, changing in color    Accompanying Signs & Symptoms:   Fever: no   Body aches or joint pain: no   Sore throat symptoms: no but has some dryness in throat  Recent cold symptoms: no     History:   Previous similar rash: YES    Precipitating factors:   Exposure to similar rash: no   New exposures: None   Recent travel: no     Alleviating factors:  none    Therapies Tried and outcome: mupirocin (BACTROBAN) 2 % ointment - not helpful    Itching when first started, no pain   6-7 months ago started, one little patch neck but might be new one on other side, no irritation from shaving or previous injury or surgery here     Otherwise healthy     Problem list and histories reviewed & adjusted, as indicated.  Additional history: as documented    Patient Active Problem List   Diagnosis     CARDIOVASCULAR SCREENING; LDL GOAL LESS THAN 160     Speaks [place], requires      BMI 30.0-30.9,adult     Past Surgical History:   Procedure Laterality Date     NO HISTORY OF SURGERY  2013       Social History   Substance Use Topics     Smoking status: Never Smoker     Smokeless tobacco: Never Used     Alcohol use No     Family History   Problem Relation Age of Onset     Cancer - colorectal No family hx of      Prostate Cancer No family hx of          Current Outpatient Prescriptions   Medication Sig Dispense Refill     triamcinolone (KENALOG) 0.1 % cream Apply sparingly to affected area three times daily for 14 days. 30 g 0     cetirizine (ZYRTEC) 10 MG tablet Take 1 tablet (10 mg) by mouth every evening (Patient not taking: Reported on 9/26/2018) 30 tablet 1     NO ACTIVE MEDICATIONS        No Known Allergies    Reviewed and updated as needed this visit by clinical  staff       Reviewed and updated as needed this visit by Provider         ROS:  CONSTITUTIONAL: NEGATIVE for fever, chills, change in weight  INTEGUMENTARY/SKIN: see HPI   ENT/MOUTH: NEGATIVE for ear, mouth and throat problems  RESP: NEGATIVE for significant cough or SOB  CV: NEGATIVE for chest pain, palpitations or peripheral edema  GI: NEGATIVE for nausea, abdominal pain, heartburn, or change in bowel habits  MUSCULOSKELETAL: NEGATIVE for significant arthralgias or myalgia  ENDOCRINE: NEGATIVE for temperature intolerance, skin/hair changes  HEME/ALLERGY/IMMUNE: NEGATIVE for bleeding problems    OBJECTIVE:     /80  Pulse 90  Temp 98.5  F (36.9  C) (Oral)  Resp 16  Wt 183 lb (83 kg)  SpO2 98%  BMI 31.24 kg/m2  Body mass index is 31.24 kg/(m^2).  GENERAL: healthy, alert and no distress  NECK: no adenopathy, no asymmetry, masses, or scars and thyroid normal to palpation  RESP: Respiratory rate regular and breathing easily   CV: No abnormal color and extremities warm   MS: no gross musculoskeletal defects noted, no edema  SKIN: 1 cm circular hypopigmented area of skin without any erythema or induration, surrounding skin normal  NEURO: Normal strength and tone, mentation intact and speech normal    Diagnostic Test Results:  none     ASSESSMENT/PLAN:         ICD-10-CM    1. Vitiligo L80 triamcinolone (KENALOG) 0.1 % cream   benign appearing hypopigmented lesion that didn't respond to bactroban he received after thought to be folliculitis, think looks more like vitiligo rather than scar tissue but either way would just leave it alone if this does not help it. Discussed it could get worse if vitiligo and ways of coping and all questions answered      Patient Instructions   Try the cream twice a day for 2 weeks to see if any improvement, if it doesn't get better in 2 weeks please stop using this.       NIKKO Anders [facility]

## 2019-05-26 ENCOUNTER — HOSPITAL ENCOUNTER (EMERGENCY)
Facility: CLINIC | Age: 39
Discharge: HOME | End: 2019-05-26
Attending: EMERGENCY MEDICINE | Admitting: EMERGENCY MEDICINE

## 2019-05-26 VITALS
HEIGHT: 65 IN | BODY MASS INDEX: 29.82 KG/M2 | RESPIRATION RATE: 16 BRPM | HEART RATE: 75 BPM | TEMPERATURE: 97 F | DIASTOLIC BLOOD PRESSURE: 83 MMHG | WEIGHT: 179 LBS | SYSTOLIC BLOOD PRESSURE: 126 MMHG | OXYGEN SATURATION: 100 %

## 2019-05-26 DIAGNOSIS — R09.89 THROAT TIGHTNESS: ICD-10-CM

## 2019-05-26 PROCEDURE — 99284 EMERGENCY DEPT VISIT MOD MDM: CPT | Mod: Z6 | Performed by: EMERGENCY MEDICINE

## 2019-05-26 PROCEDURE — 99282 EMERGENCY DEPT VISIT SF MDM: CPT | Performed by: EMERGENCY MEDICINE

## 2019-05-26 ASSESSMENT — MIFFLIN-ST. JEOR: SCORE: 1658.82

## 2019-05-26 NOTE — ED PROVIDER NOTES
"  History     Chief Complaint   Patient presents with     Pharyngitis     patient reported having pain on throat when breathing, denies dysphagia.     HPI  Casi Villatoro is a 39 year old male who presents with sensation of throat discomfort and tightness that occurs only when he is sleeping.  This has been something the patient has experienced over the last 3 to 4 years.  However, over the last episode this occurred 3 times.  He experiences sensation of tightness and discomfort in his throat immediately followed by some difficulty with breathing which then resolves after drinking water or coughing, generally over about 10 minutes.    No associated fevers or chills.  No subjective reflux symptoms.  No chest pain or shortness of breath no rashes no throat trauma.    Does not smoke.  No associated rashes or itching during episodes.  Not lightheaded    I have reviewed the Medications, Allergies, Past Medical and Surgical History, and Social History in the Epic system.    Review of Systems   14 point review of symptoms was performed and is negative except as noted above.     Physical Exam   BP: (!) 140/92  Pulse: 83  Temp: 98  F (36.7  C)  Resp: 18  Height: 165.1 cm (5' 5\")  Weight: 81.2 kg (179 lb)  SpO2: 99 %      Physical Exam  GEN: Well appearing, non toxic, cooperative and conversant.   HEENT: The head is normocephalic and atraumatic. Pupils are equal round and reactive to light. Extraocular motions are intact. There is no facial swelling. The neck is nontender and supple.  Oropharynx is clear without evident erythema.  No tonsillar swelling.  No uvular edema.  CV: Regular rate and rhythm without murmurs rubs or gallops. 2+ radial pulses bilaterally.  PULM: Clear to auscultation bilaterally.  ABD: Soft, nontender, nondistended.   EXT: Full range of motion.  No edema.  NEURO: Cranial nerves II through XII are intact and symmetric. Bilateral upper and lower extremities grossly show full range of motion without any focal " deficits.   SKIN: No rashes, ecchymosis, or lacerations  PSYCH: Calm and cooperative, interactive.     ED Course        Procedures               Labs Ordered and Resulted from Time of ED Arrival Up to the Time of Departure from the ED - No data to display         Assessments & Plan (with Medical Decision Making)   39-year-old male presenting with throat discomfort in the context of sleeping as described.  A chronic process for the last several years but likely increasing in frequency.  Was seen by primary care doctor for this 1 year ago, but did not follow-up with ENT yet.  Differential is broad including laryngospasm, esophageal spasm, mass/malignancy of the larynx or other adjacent structure, pharyngitis, tonsillitis, among other causes    Examination is quite unremarkable.  He is currently asymptomatic of these events only occur at night.    We discussed a variety of options ultimately would be good to rule out reflux causing esophageal or laryngeal spasm.  We will initiate Zantac trial for for several weeks while he awaits ENT follow-up.  We discussed this critically important in order to exclude an anatomic abnormality or malignancy as well.  Patient understands and will make the appointment.  In the meantime he will start Zantac.    - Patient agrees to our plan and is ready and eager for discharge. Care plan, follow up plan, and reasons to return immediately to the ED were dicussed in detail and summarized as noted in the discharge instructions.    I have reviewed the nursing notes.    I have reviewed the findings, diagnosis, plan and need for follow up with the patient.       Medication List      Started    ranitidine 150 MG tablet  Commonly known as:  ZANTAC  150 mg, Oral, 2 TIMES DAILY            Final diagnoses:   Throat tightness       5/26/2019   [facility], [place], EMERGENCY DEPARTMENT     Jose Francisco Joyner MD  05/26/19 5729

## 2019-05-26 NOTE — DISCHARGE INSTRUCTIONS
Please make an appointment to follow up with [facility] (phone: [phone]) in 7-10 days even if entirely better.    Return for any worsening symptoms including throat tightness that persists, shortness of breath, weakness, fever or any other concerns for worsening

## 2019-05-26 NOTE — ED AVS SNAPSHOT
[facility], [place], Emergency Department  [address]  [place] [address]  Phone:  [phone]  Fax:  [phone]                                    Casi Villatoro   MRN: [id number]    Department:  [facility], Emergency Department   Date of Visit:  5/26/2019           After Visit Summary Signature Page    I have received my discharge instructions, and my questions have been answered. I have discussed any challenges I see with this plan with the nurse or doctor.    ..........................................................................................................................................  Patient/Patient Representative Signature      ..........................................................................................................................................  Patient Representative Print Name and Relationship to Patient    ..................................................               ................................................  Date                                   Time    ..........................................................................................................................................  Reviewed by Signature/Title    ...................................................              ..............................................  Date                                               Time          22EPIC Rev 08/18

## 2019-06-26 ENCOUNTER — HOSPITAL ENCOUNTER (INPATIENT)
Facility: CLINIC | Age: 39
LOS: 3 days | Discharge: HOME | DRG: 638 | End: 2019-06-29
Attending: EMERGENCY MEDICINE | Admitting: INTERNAL MEDICINE

## 2019-06-26 DIAGNOSIS — E11.9 TYPE 2 DIABETES MELLITUS WITHOUT COMPLICATION, UNSPECIFIED WHETHER LONG TERM INSULIN USE (H): Primary | ICD-10-CM

## 2019-06-26 DIAGNOSIS — E87.0 HYPEROSMOLAR HYPONATREMIA: ICD-10-CM

## 2019-06-26 DIAGNOSIS — R73.9 HYPERGLYCEMIA: ICD-10-CM

## 2019-06-26 DIAGNOSIS — E87.1 HYPEROSMOLAR HYPONATREMIA: ICD-10-CM

## 2019-06-26 LAB
ALBUMIN SERPL-MCNC: 4 G/DL (ref 3.4–5)
ALBUMIN UR-MCNC: NEGATIVE MG/DL
ALP SERPL-CCNC: 71 U/L (ref 40–150)
ALT SERPL W P-5'-P-CCNC: 63 U/L (ref 0–70)
ANION GAP SERPL CALCULATED.3IONS-SCNC: 5 MMOL/L (ref 3–14)
ANION GAP SERPL CALCULATED.3IONS-SCNC: 8 MMOL/L (ref 3–14)
APPEARANCE UR: CLEAR
AST SERPL W P-5'-P-CCNC: ABNORMAL U/L (ref 0–45)
BASOPHILS # BLD AUTO: 0 10E9/L (ref 0–0.2)
BASOPHILS NFR BLD AUTO: 0.2 %
BILIRUB SERPL-MCNC: 0.8 MG/DL (ref 0.2–1.3)
BILIRUB UR QL STRIP: NEGATIVE
BUN SERPL-MCNC: 11 MG/DL (ref 7–30)
BUN SERPL-MCNC: 15 MG/DL (ref 7–30)
CALCIUM SERPL-MCNC: 8.5 MG/DL (ref 8.5–10.1)
CALCIUM SERPL-MCNC: 9 MG/DL (ref 8.5–10.1)
CHLORIDE SERPL-SCNC: 106 MMOL/L (ref 94–109)
CHLORIDE SERPL-SCNC: 96 MMOL/L (ref 94–109)
CO2 SERPL-SCNC: 26 MMOL/L (ref 20–32)
CO2 SERPL-SCNC: 28 MMOL/L (ref 20–32)
COLOR UR AUTO: ABNORMAL
CREAT SERPL-MCNC: 0.58 MG/DL (ref 0.66–1.25)
CREAT SERPL-MCNC: 0.68 MG/DL (ref 0.66–1.25)
DIFFERENTIAL METHOD BLD: ABNORMAL
EOSINOPHIL # BLD AUTO: 0.1 10E9/L (ref 0–0.7)
EOSINOPHIL NFR BLD AUTO: 2.9 %
ERYTHROCYTE [DISTWIDTH] IN BLOOD BY AUTOMATED COUNT: 13.2 % (ref 10–15)
GFR SERPL CREATININE-BSD FRML MDRD: >90 ML/MIN/{1.73_M2}
GFR SERPL CREATININE-BSD FRML MDRD: >90 ML/MIN/{1.73_M2}
GLUCOSE BLDC GLUCOMTR-MCNC: 155 MG/DL (ref 70–99)
GLUCOSE BLDC GLUCOMTR-MCNC: 186 MG/DL (ref 70–99)
GLUCOSE BLDC GLUCOMTR-MCNC: 215 MG/DL (ref 70–99)
GLUCOSE BLDC GLUCOMTR-MCNC: 226 MG/DL (ref 70–99)
GLUCOSE BLDC GLUCOMTR-MCNC: 254 MG/DL (ref 70–99)
GLUCOSE BLDC GLUCOMTR-MCNC: 255 MG/DL (ref 70–99)
GLUCOSE BLDC GLUCOMTR-MCNC: 310 MG/DL (ref 70–99)
GLUCOSE BLDC GLUCOMTR-MCNC: 385 MG/DL (ref 70–99)
GLUCOSE BLDC GLUCOMTR-MCNC: 510 MG/DL (ref 70–99)
GLUCOSE BLDC GLUCOMTR-MCNC: >600 MG/DL (ref 70–99)
GLUCOSE SERPL-MCNC: 236 MG/DL (ref 70–99)
GLUCOSE SERPL-MCNC: 684 MG/DL (ref 70–99)
GLUCOSE UR STRIP-MCNC: >1000 MG/DL
HBA1C MFR BLD: 11.2 % (ref 0–5.6)
HCT VFR BLD AUTO: 45.9 % (ref 40–53)
HGB BLD-MCNC: 15.5 G/DL (ref 13.3–17.7)
HGB UR QL STRIP: NEGATIVE
IMM GRANULOCYTES # BLD: 0 10E9/L (ref 0–0.4)
IMM GRANULOCYTES NFR BLD: 0.4 %
KETONES BLD-SCNC: 0.2 MMOL/L (ref 0–0.6)
KETONES UR STRIP-MCNC: NEGATIVE MG/DL
LEUKOCYTE ESTERASE UR QL STRIP: NEGATIVE
LYMPHOCYTES # BLD AUTO: 2.1 10E9/L (ref 0.8–5.3)
LYMPHOCYTES NFR BLD AUTO: 42.2 %
MAGNESIUM SERPL-MCNC: 1.9 MG/DL (ref 1.6–2.3)
MCH RBC QN AUTO: 29.4 PG (ref 26.5–33)
MCHC RBC AUTO-ENTMCNC: 33.8 G/DL (ref 31.5–36.5)
MCV RBC AUTO: 87 FL (ref 78–100)
MONOCYTES # BLD AUTO: 0.3 10E9/L (ref 0–1.3)
MONOCYTES NFR BLD AUTO: 6.1 %
NEUTROPHILS # BLD AUTO: 2.4 10E9/L (ref 1.6–8.3)
NEUTROPHILS NFR BLD AUTO: 48.2 %
NITRATE UR QL: NEGATIVE
NRBC # BLD AUTO: 0 10*3/UL
NRBC BLD AUTO-RTO: 0 /100
OSMOLALITY SERPL: 326 MMOL/KG (ref 275–295)
PH UR STRIP: 5 PH (ref 5–7)
PHOSPHATE SERPL-MCNC: 4.1 MG/DL (ref 2.5–4.5)
PLATELET # BLD AUTO: 148 10E9/L (ref 150–450)
POTASSIUM SERPL-SCNC: 3.7 MMOL/L (ref 3.4–5.3)
POTASSIUM SERPL-SCNC: 4.8 MMOL/L (ref 3.4–5.3)
PROT SERPL-MCNC: 8.2 G/DL (ref 6.8–8.8)
RBC # BLD AUTO: 5.28 10E12/L (ref 4.4–5.9)
RBC #/AREA URNS AUTO: <1 /HPF (ref 0–2)
SODIUM SERPL-SCNC: 129 MMOL/L (ref 133–144)
SODIUM SERPL-SCNC: 140 MMOL/L (ref 133–144)
SOURCE: ABNORMAL
SP GR UR STRIP: 1.02 (ref 1–1.03)
UROBILINOGEN UR STRIP-MCNC: NORMAL MG/DL (ref 0–2)
WBC # BLD AUTO: 4.9 10E9/L (ref 4–11)
WBC #/AREA URNS AUTO: 1 /HPF (ref 0–5)

## 2019-06-26 PROCEDURE — 25000125 ZZHC RX 250: Performed by: EMERGENCY MEDICINE

## 2019-06-26 PROCEDURE — 99285 EMERGENCY DEPT VISIT HI MDM: CPT | Mod: 25 | Performed by: EMERGENCY MEDICINE

## 2019-06-26 PROCEDURE — 25800030 ZZH RX IP 258 OP 636: Performed by: INTERNAL MEDICINE

## 2019-06-26 PROCEDURE — 25800030 ZZH RX IP 258 OP 636

## 2019-06-26 PROCEDURE — 36415 COLL VENOUS BLD VENIPUNCTURE: CPT | Performed by: INTERNAL MEDICINE

## 2019-06-26 PROCEDURE — 12000001 ZZH R&B MED SURG/OB UMMC

## 2019-06-26 PROCEDURE — 82010 KETONE BODYS QUAN: CPT | Performed by: EMERGENCY MEDICINE

## 2019-06-26 PROCEDURE — 00000146 ZZHCL STATISTIC GLUCOSE BY METER IP

## 2019-06-26 PROCEDURE — 81001 URINALYSIS AUTO W/SCOPE: CPT | Performed by: EMERGENCY MEDICINE

## 2019-06-26 PROCEDURE — 25000128 H RX IP 250 OP 636: Performed by: EMERGENCY MEDICINE

## 2019-06-26 PROCEDURE — 85025 COMPLETE CBC W/AUTO DIFF WBC: CPT | Performed by: EMERGENCY MEDICINE

## 2019-06-26 PROCEDURE — 99222 1ST HOSP IP/OBS MODERATE 55: CPT | Mod: AI | Performed by: INTERNAL MEDICINE

## 2019-06-26 PROCEDURE — 80048 BASIC METABOLIC PNL TOTAL CA: CPT | Performed by: INTERNAL MEDICINE

## 2019-06-26 PROCEDURE — 99285 EMERGENCY DEPT VISIT HI MDM: CPT | Mod: Z6 | Performed by: EMERGENCY MEDICINE

## 2019-06-26 PROCEDURE — 25000131 ZZH RX MED GY IP 250 OP 636 PS 637: Performed by: INTERNAL MEDICINE

## 2019-06-26 PROCEDURE — 83930 ASSAY OF BLOOD OSMOLALITY: CPT | Performed by: EMERGENCY MEDICINE

## 2019-06-26 PROCEDURE — 83735 ASSAY OF MAGNESIUM: CPT | Performed by: EMERGENCY MEDICINE

## 2019-06-26 PROCEDURE — 99207 ZZC CDG-MDM COMPONENT: MEETS MODERATE - UP CODED: CPT | Performed by: INTERNAL MEDICINE

## 2019-06-26 PROCEDURE — 80053 COMPREHEN METABOLIC PANEL: CPT | Performed by: EMERGENCY MEDICINE

## 2019-06-26 PROCEDURE — 84100 ASSAY OF PHOSPHORUS: CPT | Performed by: EMERGENCY MEDICINE

## 2019-06-26 PROCEDURE — 83036 HEMOGLOBIN GLYCOSYLATED A1C: CPT | Performed by: EMERGENCY MEDICINE

## 2019-06-26 PROCEDURE — 96365 THER/PROPH/DIAG IV INF INIT: CPT | Performed by: EMERGENCY MEDICINE

## 2019-06-26 PROCEDURE — 25800030 ZZH RX IP 258 OP 636: Performed by: EMERGENCY MEDICINE

## 2019-06-26 PROCEDURE — 96361 HYDRATE IV INFUSION ADD-ON: CPT | Performed by: EMERGENCY MEDICINE

## 2019-06-26 RX ORDER — SODIUM CHLORIDE 9 MG/ML
INJECTION, SOLUTION INTRAVENOUS CONTINUOUS
Status: DISCONTINUED | OUTPATIENT
Start: 2019-06-26 | End: 2019-06-29 | Stop reason: HOSPADM

## 2019-06-26 RX ORDER — SODIUM CHLORIDE 9 MG/ML
INJECTION, SOLUTION INTRAVENOUS CONTINUOUS
Status: DISCONTINUED | OUTPATIENT
Start: 2019-06-26 | End: 2019-06-27

## 2019-06-26 RX ORDER — ONDANSETRON 4 MG/1
4 TABLET, ORALLY DISINTEGRATING ORAL EVERY 6 HOURS PRN
Status: DISCONTINUED | OUTPATIENT
Start: 2019-06-26 | End: 2019-06-29 | Stop reason: HOSPADM

## 2019-06-26 RX ORDER — LIDOCAINE 40 MG/G
CREAM TOPICAL
Status: DISCONTINUED | OUTPATIENT
Start: 2019-06-26 | End: 2019-06-29 | Stop reason: HOSPADM

## 2019-06-26 RX ORDER — ONDANSETRON 2 MG/ML
4 INJECTION INTRAMUSCULAR; INTRAVENOUS EVERY 6 HOURS PRN
Status: DISCONTINUED | OUTPATIENT
Start: 2019-06-26 | End: 2019-06-29 | Stop reason: HOSPADM

## 2019-06-26 RX ORDER — AMOXICILLIN 250 MG
1 CAPSULE ORAL 2 TIMES DAILY PRN
Status: DISCONTINUED | OUTPATIENT
Start: 2019-06-26 | End: 2019-06-29 | Stop reason: HOSPADM

## 2019-06-26 RX ORDER — DEXTROSE MONOHYDRATE 25 G/50ML
25-50 INJECTION, SOLUTION INTRAVENOUS
Status: DISCONTINUED | OUTPATIENT
Start: 2019-06-26 | End: 2019-06-29 | Stop reason: HOSPADM

## 2019-06-26 RX ORDER — NICOTINE POLACRILEX 4 MG
15-30 LOZENGE BUCCAL
Status: DISCONTINUED | OUTPATIENT
Start: 2019-06-26 | End: 2019-06-29 | Stop reason: HOSPADM

## 2019-06-26 RX ORDER — SODIUM CHLORIDE 9 MG/ML
INJECTION, SOLUTION INTRAVENOUS
Status: COMPLETED
Start: 2019-06-26 | End: 2019-06-26

## 2019-06-26 RX ORDER — NALOXONE HYDROCHLORIDE 0.4 MG/ML
.1-.4 INJECTION, SOLUTION INTRAMUSCULAR; INTRAVENOUS; SUBCUTANEOUS
Status: DISCONTINUED | OUTPATIENT
Start: 2019-06-26 | End: 2019-06-29 | Stop reason: HOSPADM

## 2019-06-26 RX ORDER — ACETAMINOPHEN 325 MG/1
650 TABLET ORAL EVERY 4 HOURS PRN
Status: DISCONTINUED | OUTPATIENT
Start: 2019-06-26 | End: 2019-06-29 | Stop reason: HOSPADM

## 2019-06-26 RX ADMIN — SODIUM CHLORIDE: 9 INJECTION, SOLUTION INTRAVENOUS at 17:32

## 2019-06-26 RX ADMIN — SODIUM CHLORIDE: 9 INJECTION, SOLUTION INTRAVENOUS at 19:05

## 2019-06-26 RX ADMIN — HUMAN INSULIN 3 UNITS/HR: 100 INJECTION, SOLUTION SUBCUTANEOUS at 17:39

## 2019-06-26 RX ADMIN — SODIUM CHLORIDE 1000 ML: 9 INJECTION, SOLUTION INTRAVENOUS at 14:54

## 2019-06-26 RX ADMIN — Medication 1000 ML: at 13:12

## 2019-06-26 RX ADMIN — SODIUM CHLORIDE 1000 ML: 9 INJECTION, SOLUTION INTRAVENOUS at 13:12

## 2019-06-26 RX ADMIN — INSULIN ASPART 3 UNITS: 100 INJECTION, SOLUTION INTRAVENOUS; SUBCUTANEOUS at 19:42

## 2019-06-26 ASSESSMENT — ENCOUNTER SYMPTOMS
FEVER: 0
APPETITE CHANGE: 0
POLYDIPSIA: 1
DIFFICULTY URINATING: 0
FREQUENCY: 1
ABDOMINAL PAIN: 0
SHORTNESS OF BREATH: 0
HEMATURIA: 0
DYSURIA: 0
VOMITING: 0
NAUSEA: 0
DIARRHEA: 0
COUGH: 0
FATIGUE: 1

## 2019-06-26 ASSESSMENT — ACTIVITIES OF DAILY LIVING (ADL)
SWALLOWING: 0-->SWALLOWS FOODS/LIQUIDS WITHOUT DIFFICULTY
TRANSFERRING: 0-->INDEPENDENT
FALL_HISTORY_WITHIN_LAST_SIX_MONTHS: NO
DRESS: 0-->INDEPENDENT
AMBULATION: 0-->INDEPENDENT
BATHING: 0-->INDEPENDENT
TOILETING: 0-->INDEPENDENT
COGNITION: 0 - NO COGNITION ISSUES REPORTED
RETIRED_COMMUNICATION: 0-->UNDERSTANDS/COMMUNICATES WITHOUT DIFFICULTY
RETIRED_EATING: 0-->INDEPENDENT
ADLS_ACUITY_SCORE: 12

## 2019-06-26 NOTE — ED PROVIDER NOTES
[facility] EMERGENCY DEPARTMENT ([facility])     June 26, 2019    History     Chief Complaint   Patient presents with     Urinary Frequency     urinary frequency for the past month, 3-4 times an hour. Feels weak and thinks has lost weight in the past month     The history is provided by the patient. A  was used (Official [place] iPad ).     Casi Villatoro is an otherwise healthy 39 year old male who presents to the ED for evaluation of a month of ongoing urinary frequency. Patient also complains he has been feeling thirsty and tired. He states he has been drinking water. Patient denies dysuria, hematuria, fever, back pain, abdominal pain, nausea, vomiting, cough, diarrhea, chest pain, shortness of breath, or changes in appetite. Denies history of diabetes or other medial problems.     PAST MEDICAL HISTORY  Past Medical History:   Diagnosis Date     BMI 31.0-31.9,adult 6/20/2013     PAST SURGICAL HISTORY  Past Surgical History:   Procedure Laterality Date     NO HISTORY OF SURGERY  2013     FAMILY HISTORY  Family History   Problem Relation Age of Onset     Cancer - colorectal No family hx of      Prostate Cancer No family hx of      SOCIAL HISTORY  Social History     Tobacco Use     Smoking status: Never Smoker     Smokeless tobacco: Never Used   Substance Use Topics     Alcohol use: No     MEDICATIONS  Current Facility-Administered Medications   Medication     dextrose 10 % 1,000 mL infusion     glucose gel 15-30 g    Or     dextrose 50 % injection 25-50 mL    Or     glucagon injection 1 mg     insulin 1 unit/mL in saline (NovoLIN, HumuLIN Regular) drip - ADULT IV Infusion     sodium chloride 0.9% infusion     Current Outpatient Medications   Medication     cetirizine (ZYRTEC) 10 MG tablet     NO ACTIVE MEDICATIONS     triamcinolone (KENALOG) 0.1 % cream     ALLERGIES  No Known Allergies      I have reviewed the Medications, Allergies, Past Medical and Surgical History, and Social  History in the Epic system.    Review of Systems   Constitutional: Positive for fatigue. Negative for appetite change and fever.   Respiratory: Negative for cough and shortness of breath.    Cardiovascular: Negative for chest pain.   Gastrointestinal: Negative for abdominal pain, diarrhea, nausea and vomiting.   Endocrine: Positive for polydipsia.   Genitourinary: Positive for frequency. Negative for difficulty urinating, dysuria and hematuria.   All other systems reviewed and are negative.      Physical Exam   BP: 136/76  Pulse: 87  Temp: 98  F (36.7  C)  Resp: 16  Weight: 76.7 kg (169 lb)  SpO2: 97 %      Physical Exam     GEN:  Well developed, no acute distress  HEENT:  EOMI, Mucous membranes are moist.   Cardio:  RRR, no murmur, radial pulses equal bilaterally  PULM:  Lungs clear, good air movement, no wheezes, rales,   Abd:  Soft, normal bowel sounds, no focal tenderness  Musculoskeletal:  normal range of motion, no lower extremity swelling or calf tenderness  Neuro:  Alert and oriented X3, Follows commands, moving all extremities spontaneously   Skin:  Warm, dry      ED Course   Labs      Procedures           Critical Care time:  none  Labs are normal except as shown.  Patient was given 2 L of normal saline IV in the ED.  Insulin drip was ordered to be started in the ED.      Labs Ordered and Resulted from Time of ED Arrival Up to the Time of Departure from the ED   ROUTINE UA WITH MICROSCOPIC REFLEX TO CULTURE - Abnormal; Notable for the following components:       Result Value    Glucose Urine >1000 (*)     All other components within normal limits   CBC WITH PLATELETS DIFFERENTIAL - Abnormal; Notable for the following components:    Platelet Count 148 (*)     All other components within normal limits   COMPREHENSIVE METABOLIC PANEL - Abnormal; Notable for the following components:    Sodium 129 (*)     Glucose 684 (*)     All other components within normal limits   GLUCOSE BY METER - Abnormal; Notable for  the following components:    Glucose >600 (*)     All other components within normal limits   OSMOLALITY - Abnormal; Notable for the following components:    Osmolality 326 (*)     All other components within normal limits   GLUCOSE BY METER - Abnormal; Notable for the following components:    Glucose 510 (*)     All other components within normal limits   GLUCOSE BY METER - Abnormal; Notable for the following components:    Glucose 385 (*)     All other components within normal limits   KETONE BETA-HYDROXYBUTYRATE QUANTITATIVE   GLUCOSE MONITOR NURSING POCT   HEMOGLOBIN A1C   NOTIFY PHYSICIAN   NOTIFY PHYSICIAN   PATIENT CARE ORDER   IP CARBOHYDRATE COUNTING BY NURSING   PATIENT EDUCATION   ASSESS FOR HYPOGLYCEMIA SYMPTOMS   NOTIFY PHYSICIAN            Assessments & Plan (with Medical Decision Making)   Patient presents with with polyuria, excessive thirst, polydipsia likely due to new onset diabetes with hyperglycemia, hyperosmolality without ketosis.  Patient is hemodynamically stable with normal mental status.  He will be placed on an insulin drip and admitted to the medicine service for further care and stabilization.  No obvious sign of infection at this time.    I have reviewed the nursing notes.    I have reviewed the findings, diagnosis, plan and need for follow up with the patient.       Medication List      There are no discharge medications for this visit.         Final diagnoses:   Hyperglycemia   Hyperosmolar hyponatremia     INicolette, am serving as a trained medical scribe to document services personally performed by Dolores Santoro MD, based on the provider's statements to me.      Dolores MONTEJO MD, was physically present and have reviewed and verified the accuracy of this note documented by Nicolette Gomez.     6/26/2019   [facility] EMERGENCY DEPARTMENT     Dolores Santoro MD  06/26/19 9678

## 2019-06-26 NOTE — H&P
Admitted:     06/26/2019      LOCATION:  Patient being admitted to a med/surg bed on 8A or 10A.      CHIEF COMPLAINT:  Increased thirst and urinary frequency.      HISTORY OF PRESENT ILLNESS:  A pleasant, generally healthy 39-year-old male admitted to the medical service through the emergency department where he presented with a constellation of urinary frequency, increased thirst and fatigue with documented marked hyperglycemia with blood sugars in excess of 600, consistent with newly diagnosed diabetes mellitus.      The patient relates being in his usual state of health until approximately 1 month ago when he developed the above symptom constellation.  Employed as a  with a new job 06/25 which he was not able to do due to frequency with large volume urine output.  The patient has had sweats, potentially related to the warm weather.  No documented fever or chills.  No headache.  No dizziness.  No visual disturbance.  Not aware of any recent infectious illness.  Has had pharyngeal dryness with a need to clear his throat.  No other upper respiratory infectious type symptoms.  No nausea, vomiting, abdominal pain or diarrhea.  No dysuria.      No prior knowledge regarding diabetes.  No family history of diabetes.  No new meds except for something for his pharyngeal dryness.  No supplements.  Record review indicates a blood glucose of 90 on 05/02/2018 with elevated triglycerides of 764 at that time.  ER evaluation today demonstrated a blood glucose in excess of 600 with lab documentation of 684, improving to 385 after 2 liters of normal saline.  Further labs remarkable for a complete metabolic profile with a low sodium of 129, potassium of 4.8, BUN of 15, creatinine of 0.68.  Normal CO2 of 28.  Normal anion gap of 5.  Normal liver profile.  Ketones 0.2.  Osmolality elevated at 326.  Normal white count 4900, hemoglobin of 15.5 grams percent, platelet count mildly reduced at 148,000.  Urinalysis bland except for  greater than 1000 glucosuria.      Case reviewed with the ER physician.  A decision to start insulin infusion using adult protocol with admission to the medical unit.      PAST MEDICAL HISTORY:  No known serious illness.  Without known heart disease, asthma, hypertension, intrinsic renal disease, peptic ulcer disease, hepatitis, gallbladder disease, thyroid disease, or anemia.      SURGERIES:  None.      ALLERGIES:  NONE KNOWN.      MEDICATIONS PRIOR TO ADMISSION:     1.  Medicine for throat dryness (as above).   2.  Zyrtec 10 mg at bedtime.   3.  Triamcinolone cream.      FAMILY HISTORY:  Unremarkable.  Specifically, no known history of diabetes.      HABITS:  Never smoked.  No alcohol.      SOCIAL HISTORY:  Has a significant other.  No children.  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      REVIEW OF SYSTEMS:  No chest discomfort, shortness of breath or cough.  No diarrhea or constipation.  Last bowel movement on 06/25.  No signs of GI blood loss.  No dysuria.  No arthralgias, rash or focal neurologic complaint.  Weight loss from 184 to 169 over the last month, at time of above symptom onset.      OBJECTIVE:   GENERAL:  Healthy-appearing adult male lying on the cart in no distress.  Alert and oriented.  Pleasant demeanor.   VITAL SIGNS:  Blood pressure 120/76 (range 109-136/72-76), heart rate 80s-90s and regular, respirations nonlabored, O2 sat 95-97% room air.   HEENT:  Extraocular movements full.  No nystagmus.  Pupils equal and reacting symmetrically.  Sclerae nonicteric.  Fundi deferred.  Oropharynx is clear.  Dentition adequate repair.  Mucosal membranes are presently moist (after 2 liters of normal saline).   NECK:  Carotid upstroke brisk.  No bruits.  There is no thyromegaly or lymphadenopathy.   SKIN:  No rash.   CHEST:  Clear lung fields.   CARDIAC:  Regular without audible gallop or murmur.  No click, no jugular venous distention.   ABDOMEN:  Nondistended, soft, nontender,  without palpable organomegaly or mass.  Bowel sounds are present.  No epigastric or ileac bruits.   EXTREMITIES:  Distal lower extremities are well perfused, no cyanosis or clubbing, no edema.   MUSCULOSKELETAL:  No posterior calf or thigh tenderness/induration to suggest DVT.   GENITALIA AND RECTAL:  Exam deferred.   NEUROLOGIC:  Grossly nonfocal.  Sensory exam not performed.      LABORATORY DATA:  As above.      ASSESSMENT:  A 39-year-old male in general good health, admitted with the followin.  Marked hyperglycemia consistent with newly diagnosed diabetes mellitus.  No family history.  No clear infectious illness as a precipitating factor.  No implicated meds or supplements.  Improved blood sugar subsequent to IV saline administration.   2.  Constellation of weight loss, polyuria, polydipsia and fatigue secondary to #1.   3.  Elevated serum osmolality secondary to osmotic diuresis in association with volume depletion.   4.  Hyponatremia, consistent with lab alteration due to degree of hyperglycemia.   5.  General good health.      PLAN:   1.  Admit to Medicine.   2.  Insulin infusion using adult protocol.   3.  Continue IV hydration.   4.  Add carb coverage 1 unit per 10 grams with meals and snacks.   5.  Diabetes Service consultation.   6.  Diabetic education.   7.  Hemoglobin A1c and lipid profile.   8.  Trend labs.   9.  Close clinical monitoring.      Patient seen using a video Boxed iPad .         LORI CORCORAN MD             D: 2019   T: 2019   MT: MICHELE      Name:     SARAH ST   MRN:      [id number]        Account:      [id number]   :      1980        Admitted:     2019                   Document: [id number]       cc: Rafa Dhillon MD

## 2019-06-26 NOTE — ED NOTES
[facility], [place]   ED Nurse to Floor Handoff     Casi Villatoro is a 39 year old male who speaks [place]romo and lives with family members,  in a home  They arrived in the ED by car from emergency room    ED Chief Complaint: Urinary Frequency (urinary frequency for the past month, 3-4 times an hour. Feels weak and thinks has lost weight in the past month)    ED Dx;   Final diagnoses:   None         Needed?: Yes    Allergies: No Known Allergies.  Past Medical Hx:   Past Medical History:   Diagnosis Date     BMI 31.0-31.9,adult 6/20/2013      Baseline Mental status: WDL  Current Mental Status changes: at basesline    Infection present or suspected this encounter: no  Sepsis suspected: No  Isolation type: No active isolations     Activity level - Baseline/Home:  Independent  Activity Level - Current:   Independent    Bariatric equipment needed?: No    In the ED these meds were given:   Medications   0.9% sodium chloride BOLUS (1,000 mLs Intravenous New Bag 6/26/19 1454)   0.9% sodium chloride BOLUS (0 mLs Intravenous Stopped 6/26/19 1359)       Drips running?  No    Home pump  No    Current LDAs       Labs results:   Labs Ordered and Resulted from Time of ED Arrival Up to the Time of Departure from the ED   ROUTINE UA WITH MICROSCOPIC REFLEX TO CULTURE - Abnormal; Notable for the following components:       Result Value    Glucose Urine >1000 (*)     All other components within normal limits   CBC WITH PLATELETS DIFFERENTIAL - Abnormal; Notable for the following components:    Platelet Count 148 (*)     All other components within normal limits   COMPREHENSIVE METABOLIC PANEL - Abnormal; Notable for the following components:    Sodium 129 (*)     Glucose 684 (*)     All other components within normal limits   GLUCOSE BY METER - Abnormal; Notable for the following components:    Glucose >600 (*)     All other components within normal limits   OSMOLALITY - Abnormal; Notable for the  following components:    Osmolality 326 (*)     All other components within normal limits   GLUCOSE BY METER - Abnormal; Notable for the following components:    Glucose 510 (*)     All other components within normal limits   KETONE BETA-HYDROXYBUTYRATE QUANTITATIVE       Imaging Studies: No results found for this or any previous visit (from the past 24 hour(s)).    Recent vital signs:   /76   Pulse 98   Temp 98  F (36.7  C) (Oral)   Resp 16   Wt 76.7 kg (169 lb)   SpO2 95%   BMI 28.12 kg/m      Ale Coma Scale Score: 15 (06/26/19 1250)       Cardiac Rhythm: Normal Sinus  Pt needs tele? No  Skin/wound Issues: None    Code Status: Full Code    Pain control: pt had none    Nausea control: pt had none    Abnormal labs/tests/findings requiring intervention: glucose    Family present during ED course? No   Family Comments/Social Situation comments: none    Tasks needing completion: None    FABY TANNER, RN  [facility] -- [phone] [place] ED  [phone] [facility]

## 2019-06-26 NOTE — PHARMACY-ADMISSION MEDICATION HISTORY
Admission medication history for the June 26, 2019 admission is complete.     Interview sources:  Patient    Reliability of source:  Good    Medication compliance: not appliacable    Current Outpatient Pharmacy: not appliacable    Changes made to PTA medication list (reason)  Added: none  Deleted: Zyrtec, triamcinolone  Changed: none    Additional medication history information:   The patient reported he does not take any prescription medications, vitamin, supplements or over the counter products.    Prior to Admission Medication List:  Prior to Admission medications    None     Time spent: 15 minutes    Medication history completed by:   Danielle Rahman, PharmD, BCPP  [facility]  Available daily from 1-9 PM: phone [phone], ascom *[phone], pager [phone]

## 2019-06-27 ENCOUNTER — OFFICE VISIT (OUTPATIENT)
Dept: INTERPRETER SERVICES | Facility: CLINIC | Age: 39
End: 2019-06-27

## 2019-06-27 LAB
ANION GAP SERPL CALCULATED.3IONS-SCNC: 5 MMOL/L (ref 3–14)
BUN SERPL-MCNC: 14 MG/DL (ref 7–30)
CALCIUM SERPL-MCNC: 8.1 MG/DL (ref 8.5–10.1)
CHLORIDE SERPL-SCNC: 105 MMOL/L (ref 94–109)
CO2 SERPL-SCNC: 29 MMOL/L (ref 20–32)
CREAT SERPL-MCNC: 0.7 MG/DL (ref 0.66–1.25)
GFR SERPL CREATININE-BSD FRML MDRD: >90 ML/MIN/{1.73_M2}
GLUCOSE BLDC GLUCOMTR-MCNC: 106 MG/DL (ref 70–99)
GLUCOSE BLDC GLUCOMTR-MCNC: 107 MG/DL (ref 70–99)
GLUCOSE BLDC GLUCOMTR-MCNC: 108 MG/DL (ref 70–99)
GLUCOSE BLDC GLUCOMTR-MCNC: 122 MG/DL (ref 70–99)
GLUCOSE BLDC GLUCOMTR-MCNC: 130 MG/DL (ref 70–99)
GLUCOSE BLDC GLUCOMTR-MCNC: 132 MG/DL (ref 70–99)
GLUCOSE BLDC GLUCOMTR-MCNC: 134 MG/DL (ref 70–99)
GLUCOSE BLDC GLUCOMTR-MCNC: 137 MG/DL (ref 70–99)
GLUCOSE BLDC GLUCOMTR-MCNC: 137 MG/DL (ref 70–99)
GLUCOSE BLDC GLUCOMTR-MCNC: 138 MG/DL (ref 70–99)
GLUCOSE BLDC GLUCOMTR-MCNC: 147 MG/DL (ref 70–99)
GLUCOSE BLDC GLUCOMTR-MCNC: 150 MG/DL (ref 70–99)
GLUCOSE BLDC GLUCOMTR-MCNC: 153 MG/DL (ref 70–99)
GLUCOSE BLDC GLUCOMTR-MCNC: 157 MG/DL (ref 70–99)
GLUCOSE BLDC GLUCOMTR-MCNC: 171 MG/DL (ref 70–99)
GLUCOSE BLDC GLUCOMTR-MCNC: 172 MG/DL (ref 70–99)
GLUCOSE BLDC GLUCOMTR-MCNC: 174 MG/DL (ref 70–99)
GLUCOSE BLDC GLUCOMTR-MCNC: 180 MG/DL (ref 70–99)
GLUCOSE BLDC GLUCOMTR-MCNC: 185 MG/DL (ref 70–99)
GLUCOSE BLDC GLUCOMTR-MCNC: 186 MG/DL (ref 70–99)
GLUCOSE BLDC GLUCOMTR-MCNC: 281 MG/DL (ref 70–99)
GLUCOSE BLDC GLUCOMTR-MCNC: 339 MG/DL (ref 70–99)
GLUCOSE BLDC GLUCOMTR-MCNC: 65 MG/DL (ref 70–99)
GLUCOSE BLDC GLUCOMTR-MCNC: 82 MG/DL (ref 70–99)
GLUCOSE BLDC GLUCOMTR-MCNC: 90 MG/DL (ref 70–99)
GLUCOSE SERPL-MCNC: 144 MG/DL (ref 70–99)
HBA1C MFR BLD: 11.1 % (ref 0–5.6)
MAGNESIUM SERPL-MCNC: 1.9 MG/DL (ref 1.6–2.3)
POTASSIUM SERPL-SCNC: 3.3 MMOL/L (ref 3.4–5.3)
SODIUM SERPL-SCNC: 139 MMOL/L (ref 133–144)
TSH SERPL DL<=0.005 MIU/L-ACNC: 1.2 MU/L (ref 0.4–4)

## 2019-06-27 PROCEDURE — 86341 ISLET CELL ANTIBODY: CPT | Performed by: PHYSICIAN ASSISTANT

## 2019-06-27 PROCEDURE — 25000132 ZZH RX MED GY IP 250 OP 250 PS 637: Performed by: INTERNAL MEDICINE

## 2019-06-27 PROCEDURE — 25000132 ZZH RX MED GY IP 250 OP 250 PS 637: Performed by: EMERGENCY MEDICINE

## 2019-06-27 PROCEDURE — 84443 ASSAY THYROID STIM HORMONE: CPT | Performed by: INTERNAL MEDICINE

## 2019-06-27 PROCEDURE — 84681 ASSAY OF C-PEPTIDE: CPT | Performed by: PHYSICIAN ASSISTANT

## 2019-06-27 PROCEDURE — 25000125 ZZHC RX 250: Performed by: EMERGENCY MEDICINE

## 2019-06-27 PROCEDURE — 36415 COLL VENOUS BLD VENIPUNCTURE: CPT | Performed by: PHYSICIAN ASSISTANT

## 2019-06-27 PROCEDURE — 12000001 ZZH R&B MED SURG/OB UMMC

## 2019-06-27 PROCEDURE — 99232 SBSQ HOSP IP/OBS MODERATE 35: CPT | Performed by: INTERNAL MEDICINE

## 2019-06-27 PROCEDURE — 80048 BASIC METABOLIC PNL TOTAL CA: CPT | Performed by: INTERNAL MEDICINE

## 2019-06-27 PROCEDURE — 83036 HEMOGLOBIN GLYCOSYLATED A1C: CPT | Performed by: INTERNAL MEDICINE

## 2019-06-27 PROCEDURE — 00000146 ZZHCL STATISTIC GLUCOSE BY METER IP

## 2019-06-27 PROCEDURE — 36415 COLL VENOUS BLD VENIPUNCTURE: CPT | Performed by: INTERNAL MEDICINE

## 2019-06-27 PROCEDURE — 80061 LIPID PANEL: CPT | Performed by: INTERNAL MEDICINE

## 2019-06-27 PROCEDURE — 83735 ASSAY OF MAGNESIUM: CPT | Performed by: INTERNAL MEDICINE

## 2019-06-27 PROCEDURE — T1013 SIGN LANG/ORAL INTERPRETER: HCPCS | Mod: U3

## 2019-06-27 PROCEDURE — 83721 ASSAY OF BLOOD LIPOPROTEIN: CPT | Performed by: INTERNAL MEDICINE

## 2019-06-27 RX ORDER — POTASSIUM CHLORIDE 750 MG/1
40 TABLET, EXTENDED RELEASE ORAL ONCE
Status: COMPLETED | OUTPATIENT
Start: 2019-06-27 | End: 2019-06-27

## 2019-06-27 RX ADMIN — INSULIN ASPART 14 UNITS: 100 INJECTION, SOLUTION INTRAVENOUS; SUBCUTANEOUS at 09:42

## 2019-06-27 RX ADMIN — HUMAN INSULIN 3 UNITS/HR: 100 INJECTION, SOLUTION SUBCUTANEOUS at 02:45

## 2019-06-27 RX ADMIN — INSULIN ASPART 9 UNITS: 100 INJECTION, SOLUTION INTRAVENOUS; SUBCUTANEOUS at 12:47

## 2019-06-27 RX ADMIN — POTASSIUM CHLORIDE 40 MEQ: 10 TABLET, EXTENDED RELEASE ORAL at 09:45

## 2019-06-27 RX ADMIN — HUMAN INSULIN 4 UNITS/HR: 100 INJECTION, SOLUTION SUBCUTANEOUS at 00:55

## 2019-06-27 RX ADMIN — DEXTROSE 15 G: 15 GEL ORAL at 15:25

## 2019-06-27 RX ADMIN — HUMAN INSULIN 2 UNITS/HR: 100 INJECTION, SOLUTION SUBCUTANEOUS at 12:38

## 2019-06-27 RX ADMIN — INSULIN ASPART 6 UNITS: 100 INJECTION, SOLUTION INTRAVENOUS; SUBCUTANEOUS at 18:37

## 2019-06-27 RX ADMIN — HUMAN INSULIN 1 UNITS/HR: 100 INJECTION, SOLUTION SUBCUTANEOUS at 00:10

## 2019-06-27 ASSESSMENT — ACTIVITIES OF DAILY LIVING (ADL)
ADLS_ACUITY_SCORE: 10

## 2019-06-27 NOTE — PROGRESS NOTES
"[facility] Internal Medicine Progress Note            Interval History:   Record reviewed.  Seen with RN and interpretor.   Remains on insulin infusion and carb coverage for meals and snacks.  BG's 80-170s.  No po except some \"grain\" from home.  Resolution of polyuria and polydipsia.  Up to BR without LH.    No CP, SOB, cough, nausea, reflux, abd pain or distention.   Last BM 3 days ago.           Medications:   All medications reviewed today          Physical Exam:   Blood pressure 115/58, pulse 71, temperature 98  F (36.7  C), temperature source Oral, resp. rate 16, weight 76.7 kg (169 lb), SpO2 97 %.  No intake or output data in the 24 hours ending 06/27/19 0852    General:  Alert.  Appropriate.  No distress.  No O2.     Heent:      Neck:    Skin:    Chest:  clear    Cardiac:  Reg without gallop, murmur.  No JVD.     Abdomen:  Non distended, soft, non-tender.  BS normal.     Extremities:  Perfused.  No edema, calf, posterior thigh tenderness to suggest DVT.     Neuro:            Data:     Results for orders placed or performed during the hospital encounter of 06/26/19 (from the past 24 hour(s))   Glucose by meter   Result Value Ref Range    Glucose >600 (HH) 70 - 99 mg/dL   UA with Microscopic reflex to Culture   Result Value Ref Range    Color Urine Straw     Appearance Urine Clear     Glucose Urine >1000 (A) NEG^Negative mg/dL    Bilirubin Urine Negative NEG^Negative    Ketones Urine Negative NEG^Negative mg/dL    Specific Gravity Urine 1.022 1.003 - 1.035    Blood Urine Negative NEG^Negative    pH Urine 5.0 5.0 - 7.0 pH    Protein Albumin Urine Negative NEG^Negative mg/dL    Urobilinogen mg/dL Normal 0.0 - 2.0 mg/dL    Nitrite Urine Negative NEG^Negative    Leukocyte Esterase Urine Negative NEG^Negative    Source Midstream Urine     WBC Urine 1 0 - 5 /HPF    RBC Urine <1 0 - 2 /HPF   CBC with platelets differential   Result Value Ref Range    WBC 4.9 4.0 - 11.0 10e9/L    RBC Count 5.28 4.4 - 5.9 10e12/L "    Hemoglobin 15.5 13.3 - 17.7 g/dL    Hematocrit 45.9 40.0 - 53.0 %    MCV 87 78 - 100 fl    MCH 29.4 26.5 - 33.0 pg    MCHC 33.8 31.5 - 36.5 g/dL    RDW 13.2 10.0 - 15.0 %    Platelet Count 148 (L) 150 - 450 10e9/L    Diff Method Automated Method     % Neutrophils 48.2 %    % Lymphocytes 42.2 %    % Monocytes 6.1 %    % Eosinophils 2.9 %    % Basophils 0.2 %    % Immature Granulocytes 0.4 %    Nucleated RBCs 0 0 /100    Absolute Neutrophil 2.4 1.6 - 8.3 10e9/L    Absolute Lymphocytes 2.1 0.8 - 5.3 10e9/L    Absolute Monocytes 0.3 0.0 - 1.3 10e9/L    Absolute Eosinophils 0.1 0.0 - 0.7 10e9/L    Absolute Basophils 0.0 0.0 - 0.2 10e9/L    Abs Immature Granulocytes 0.0 0 - 0.4 10e9/L    Absolute Nucleated RBC 0.0    Comprehensive metabolic panel   Result Value Ref Range    Sodium 129 (L) 133 - 144 mmol/L    Potassium 4.8 3.4 - 5.3 mmol/L    Chloride 96 94 - 109 mmol/L    Carbon Dioxide 28 20 - 32 mmol/L    Anion Gap 5 3 - 14 mmol/L    Glucose 684 (HH) 70 - 99 mg/dL    Urea Nitrogen 15 7 - 30 mg/dL    Creatinine 0.68 0.66 - 1.25 mg/dL    GFR Estimate >90 >60 mL/min/[1.73_m2]    GFR Estimate If Black >90 >60 mL/min/[1.73_m2]    Calcium 9.0 8.5 - 10.1 mg/dL    Bilirubin Total 0.8 0.2 - 1.3 mg/dL    Albumin 4.0 3.4 - 5.0 g/dL    Protein Total 8.2 6.8 - 8.8 g/dL    Alkaline Phosphatase 71 40 - 150 U/L    ALT 63 0 - 70 U/L    AST Unsatisfactory specimen - hemolyzed 0 - 45 U/L   Ketone Beta-Hydroxybutyrate Quantitative   Result Value Ref Range    Ketone Quantitative 0.2 0.0 - 0.6 mmol/L   Osmolality   Result Value Ref Range    Osmolality 326 (H) 275 - 295 mmol/kg   Magnesium   Result Value Ref Range    Magnesium 1.9 1.6 - 2.3 mg/dL   Phosphorus   Result Value Ref Range    Phosphorus 4.1 2.5 - 4.5 mg/dL   Glucose by meter   Result Value Ref Range    Glucose 510 (HH) 70 - 99 mg/dL   Glucose by meter   Result Value Ref Range    Glucose 385 (H) 70 - 99 mg/dL   Hemoglobin A1c   Result Value Ref Range    Hemoglobin A1C 11.2 (H) 0  - 5.6 %   Glucose by meter   Result Value Ref Range    Glucose 254 (H) 70 - 99 mg/dL   Glucose by meter   Result Value Ref Range    Glucose 226 (H) 70 - 99 mg/dL   Glucose by meter   Result Value Ref Range    Glucose 215 (H) 70 - 99 mg/dL   Basic metabolic panel   Result Value Ref Range    Sodium 140 133 - 144 mmol/L    Potassium 3.7 3.4 - 5.3 mmol/L    Chloride 106 94 - 109 mmol/L    Carbon Dioxide 26 20 - 32 mmol/L    Anion Gap 8 3 - 14 mmol/L    Glucose 236 (H) 70 - 99 mg/dL    Urea Nitrogen 11 7 - 30 mg/dL    Creatinine 0.58 (L) 0.66 - 1.25 mg/dL    GFR Estimate >90 >60 mL/min/[1.73_m2]    GFR Estimate If Black >90 >60 mL/min/[1.73_m2]    Calcium 8.5 8.5 - 10.1 mg/dL   Glucose by meter   Result Value Ref Range    Glucose 186 (H) 70 - 99 mg/dL   Glucose by meter   Result Value Ref Range    Glucose 255 (H) 70 - 99 mg/dL   Glucose by meter   Result Value Ref Range    Glucose 310 (H) 70 - 99 mg/dL   Glucose by meter   Result Value Ref Range    Glucose 155 (H) 70 - 99 mg/dL   Glucose by meter   Result Value Ref Range    Glucose 106 (H) 70 - 99 mg/dL   Glucose by meter   Result Value Ref Range    Glucose 186 (H) 70 - 99 mg/dL   Glucose by meter   Result Value Ref Range    Glucose 157 (H) 70 - 99 mg/dL   Glucose by meter   Result Value Ref Range    Glucose 90 70 - 99 mg/dL   Glucose by meter   Result Value Ref Range    Glucose 108 (H) 70 - 99 mg/dL   Glucose by meter   Result Value Ref Range    Glucose 132 (H) 70 - 99 mg/dL   Basic metabolic panel   Result Value Ref Range    Sodium 139 133 - 144 mmol/L    Potassium 3.3 (L) 3.4 - 5.3 mmol/L    Chloride 105 94 - 109 mmol/L    Carbon Dioxide 29 20 - 32 mmol/L    Anion Gap 5 3 - 14 mmol/L    Glucose 144 (H) 70 - 99 mg/dL    Urea Nitrogen 14 7 - 30 mg/dL    Creatinine 0.70 0.66 - 1.25 mg/dL    GFR Estimate >90 >60 mL/min/[1.73_m2]    GFR Estimate If Black >90 >60 mL/min/[1.73_m2]    Calcium 8.1 (L) 8.5 - 10.1 mg/dL   Lipid panel reflex to direct LDL   Result Value Ref Range     Cholesterol 168 <200 mg/dL    Triglycerides 684 (H) <150 mg/dL    HDL Cholesterol 28 (L) >39 mg/dL    LDL Cholesterol Calculated  <100 mg/dL     Cannot estimate LDL when triglyceride exceeds 400 mg/dL    Non HDL Cholesterol 140 (H) <130 mg/dL   Magnesium   Result Value Ref Range    Magnesium 1.9 1.6 - 2.3 mg/dL   TSH   Result Value Ref Range    TSH 1.20 0.40 - 4.00 mU/L   Glucose by meter   Result Value Ref Range    Glucose 82 70 - 99 mg/dL   Glucose by meter   Result Value Ref Range    Glucose 171 (H) 70 - 99 mg/dL                Assessment and Plan:   1.  Marked hyperglycemia consistent with newly diagnosed diabetes mellitus.  Ongoing issue with A1c 11.2.  Improved on drip.  2.  Constellation of weight loss, polyuria, polydipsia and fatigue secondary to #1.   3.  Elevated serum osmolality secondary to osmotic diuresis in association with volume depletion.   4.  Hyponatremia, consistent with lab alteration due to degree of hyperglycemia.  Resolved.   5.  Hyperlipidemia, aggravated by #1.    6. General good health.     PLAN:  1)  Diabetes consult for transition off drip.  Diabetic education.  2)  Encouraged po.  Up as tolerated.  3)  SL IV.   Disposition Plan   Expected discharge in 1-2 days to prior living arrangement once diabetic management in place. .     Entered: Maikel Rodriguez 06/27/2019, 8:52 AM              Attestation:  I have reviewed today's vital signs, notes, medications, labs and imaging.     Maikel Rodriguez MD

## 2019-06-27 NOTE — PLAN OF CARE
Cassismanoj   new diabetic  S- Pt states noticed syptoms a month ago but just found out about being diabetic thsi admission  States feeling better today.  Pt is oromo , does understand some english.  Have used  several times today and also without.    Pt cooperative with infomring staff when he is going to eat.  Pt has spent quite a bit of time up in family lounge w visitors.  Has walked in torres.    States less freq voiding.    Carb coverage w meals done- pt eating about 3/4 of meal.  Also has some food from home- used intranet to estimate his food from home carbs.   Insulin drip adjusted q1-2hr alg 2,  rate 2-10u/hr.  BG did drop ot 65 at 1515.  Insulin turned off- 4oz apple cristhian given.  BG came up to 130- drip resumed.  Pt denies feeling any syupmtoms of low blood sugar.   Plan to keep drip overnite per diabetis team.  Pt aware and accepting.   Explained and showed pt how to give subcutaneous insulin.  Pt practiced on a towel.  Pt states feels ready to give self dose this evening for dinner coverage.  10-0920a [facility] .      Pt has many questions about which food has carbs- discussed his meals.  Showed how to look up on his cell phone.     A- pt is interested and willing to learn.   BG is variable.    R- Cont q1hr bg   cont education. [facility]

## 2019-06-27 NOTE — CONSULTS
NEW INPATIENT DIABETES MANAGEMENT CONSULT  Casi Villatoro  Age: 39 year old  MRN # [id number]   YOB: 1980    Chief Complaint: polyuria, polydipsia, fatigue  Reason for Consult: new diagnosis of diabetes  Consulting Provider: Maikel Rodriguez MD    History of Present Illness:   Casi is a 39 year old male who was admitted 6/26/19 for evaluation of polyuria, polydipsia, and fatigue what has been ongoing for about a month. He denies other PMHx. He has no known FHx of diabetes. He denies fevers, chills, cough, shortness of breath, abdominal pain, nausea, open wounds, or other localizing signs/symptoms of infections. He has been eating well.     On initial presentation, BG >600. UA with glucosuria, no ketonuria. BMP with normal AG and Cr, + pseudohyponatremia. BhB 0.2 (WNL). Urine osmolality elevated at 326. He received IVF and BG went to 385 after two hours. He was initiated in IV insulin with variable rates in the setting of conservative CHO aspart dosing of 1:10g CHO. BG also remained variable, intermittently into 300's even while on IV insulin post meal intake of >140g CHO.     Overnight last night, in setting of NPO status, his IV rates were between 0-3 units per hour, with average hourly rate of 1.7 units per hour.     History obtained using a professional in person [place]rom[place] . Pt also does speak some conversational english. A friend that was present with him offered a prior history of patient having a stroke; I did a thorough search of his chart and did not find any prior documentation of this. He had a brief hospitalization in 2014 at [facility] for AMS, but no cause was found and it was not suspected that he had a stroke. Pt could not offer any other insight on this.     Other Active Medical Problems: electrolyte disturbances r/t hyperglycemia  Diabetes Mellitus Type: MARIA FERNANDA versus type II  Prior to Admission Diabetes Regimen:  N/a, new diagnosis.   Usual BG control PTA:  A1c 11.1%  Primary Care  Provider: Rafa Dhillon  Current Diet: Orders Placed This Encounter      Combination Diet Regular Diet Adult      10 point ROS completed with pertinent positives and negatives noted in the HPI  Past medical, family and social histories are reviewed and updated.    Social History    Tobacco: never    Alcohol: denies    Marital Status:     Place of Residence: [place], [place]- refugee from [facility]     Family History   pt denies a prior FHx of diabetes.     Physical Exam   /66 (BP Location: Left arm)   Pulse 65   Temp 96.8  F (36  C) (Oral)   Resp 15   Wt 76.7 kg (169 lb)   SpO2 95%   BMI 28.12 kg/m    General: pleasant, in no distress.   HEENT: normocephalic, atraumatic. Oral mucous membranes moist.   Lungs: unlabored respiration, no cough  ABD: rounded, soft, no lipodystrophy noted  Skin: warm and dry, no obvious lesions  MSK:  moves all extremities  Lymp:  no LE edema   Mental status:  alert, oriented to self, place, time  Psych:  affect, calm and appropriate interaction     Most Recent Laboratory Tests:  Recent Labs   Lab 06/26/19  1305   HGB 15.5     Recent Labs   Lab 06/26/19  1630   A1C 11.2*     Recent Labs   Lab 06/27/19  0538   CR 0.70     Recent Labs   Lab 06/27/19  1411 06/27/19  1326 06/27/19  1229 06/27/19  1117 06/27/19  1018 06/27/19  0857  06/27/19  0538  06/26/19  1952  06/26/19  1305   GLC  --   --   --   --   --   --   --  144*  --  236*  --  684*   * 137* 137* 339* 281* 147*   < >  --    < >  --    < >  --     < > = values in this interval not displayed.     Assessment:   1) Hyperglycemic Hyperosmolar State (HHS)- BG >600 and Osm >320mOsm/kg.   2) New diagnosis of diabetes; type unspecified, A1c 11.2%     Labs pending to assess for latent autoimmune diabetes in adulthood (MARIA FERNANDA) versus type II. He does not fit a phenotypic presentation for Type II, and has no known FHx of diabetes. His dietary intake is high in carbohydrates, and he would benefit from a consistent carb  diet for improved glycemic control.     Due to A1c 11%, recommend initiation of insulin regimen upon discharge. Will assess for appropriateness of initiating oral medications prior to discharge, pending determination on type I/II status.    Of note, he does not have active insurance at this time; this will complicate his insulin management. Recommend  involvement. If he is unable to obtain insurance, he may require use of Relion brand insulin (available at [facility]); this is Novolin N/R insulin via vial and syringe. Will change teaching required as well.     Plan:    -continue IV insulin today; possible transition tomorrow AM.    -increase mealtime aspart from 1:10g CHO to 1:6g CHO beginning with lunch today.    -C-peptide and RICHY Ab pending   -oral medications may be considered upon determination on type I/II status- may need to be assessed in the outpatient setting.   -BG monitoring q1-2 hours per IV insulin protocol.   -hypoglycemia protocol   -recommend consistent carb diet with carb counting protocol- RD consulted for consistent carb diet teaching   -will need diabetes education prior to discharge for glucometer and insulin teaching; will see on 6/28.   -on discharge, will recommend patient establish care with [facility] for follow up within 2-3 weeks (appt request not yet sent to CL schedulers).    Discussed plan of care with patient, nursing, and primary team   Thank you for this consult; Inpatient Diabetes will continue to follow.     Diabetes Management Team job code: [id number]    I spent a total of 80 minutes bedside and on the inpatient unit managing glycemic care. Over 50% of my time on the unit was spent counseling the patient and/or coordinating care regarding acute hyperglycemia management.  See note for details.    Trena Fletcher PA-C  Inpatient Diabetes Management Service  Pager [phone]

## 2019-06-27 NOTE — PLAN OF CARE
Pt arrived from ED in wheelchair and independently transferred to bed @ 1900. Pt insulin pump running when arrived on floor. Educated pt about carb counting and needing insulin to cover. Pt in family lounge when reassessing drip and found pt eating barley in a plastic bad with no label and no way of determining the carb amount. Pt speaks Oromo and a little English. IPad interpretor ready to use when needed. [facility]. Lungs CTA, BS+ LBM 6/25/19. Able to make needs known and call light in room within reach if needed. Continue to monitor and follow plan of care.

## 2019-06-27 NOTE — PLAN OF CARE
VS:   VSS, Lung sounds clear   Output:   Voiding without difficulty.  Patient reports BM last evening.   Activity:   Up ad edwin independently   Skin: WDL   Pain:   Denies   Neuro/CMS:   Intact; patient denies numbness and/or tingling   Dressing(s):   None   Diet:   Consistent carb diabetic diet.  Patient had been eating barley just prior to start of shift which could account for blood glucose fluctuations.   LDA:   PIV infusing with insulin drip.   Equipment:   Insulin pump, IV pole   Plan:   Continue to monitor blood glucose and dietary intake.  Patient able to make needs known.   Additional Info:

## 2019-06-27 NOTE — PHARMACY
Consulted by diabetes management team for test claims on blood glucose meter and testing supplies. Patient has not active insurance, so unable to determine preferred product at this time.    -If patient reactivates [facility] (inactive as of 9/30/18), preferred product is OneTouch meter and supplies.    -If patient reverts to [place] Medicaid, preferred product is Accu-chek meter and supplies.    -If patient likely to remain uninsured, cheapest meter and supplies are either Glucocard or TrueTest.    Feel free to contact me with any other test claims, prior authorizations, or insurance questions regarding outpatient medications.     Thanks!    Peg Cameron  [place] [facility] Liaison     [facility]  [facility]  [address] [place] [address] [address] [address]   [email or web address]  [email or web address]   Phone: [phone]  Pager: [phone]  Fax: [phone]

## 2019-06-27 NOTE — PROGRESS NOTES
Nutrition Services - Education Note    Reason for Assessment:   Casi Villatoro is a 39 year old male seen by the dietitian for diabetes education.     Met with pt with .     Diet History:   - Pt follows a regular diet at home, eating traditional [place] foods.   Breakfast: 2 slices bread with 2 scoops lentil based spread   Lunch/Dinner: spaghetti, rice, injera with meats and vegetables  - Denies ever counting carbs before and is not sure which foods have carbs. Denies looking at nutrition labels and states most foods he consumes do not have labels.    A1C 11.1%  Novolog 1 unit:6 g CHO    Nutrition Diagnosis:   Food- and nutrition-related knowledge deficit r/t no previous knowledge of consistent carb diet as evidenced by uncontrolled DM    Interventions:  Provided nutrition education on which foods contain carbohydrates and importance of moderate, consistent intakes of these along with pairing with protein/fats.    Provided handouts titled: Diabetes Meal Plan Basics (in [place], obtained from https://[email or web address].org/patient-education/diabetes)  Patient able to demonstrate which foods will raise his BG.    After discussion, pt will likely be more compliant with a fixed dose insulin regimen as he was hesitant about calculating carb intake and dosing insulin accordingly. If this is ideal, would recommend further education, likely on an OP basis.     Goals:   Patient will verbalize understanding of consistent CHO diet, meal planning and label reading.   Reduce HgbA1c.  Accuracy with carbohydrate counting.     Follow-up:    Patient to ask any further nutrition-related questions before discharge. Would recommend referral for outpatient RD appointment for ongoing education needs post discharge.       Amanda Melchor RD, LD  Unit Pager: [phone]

## 2019-06-28 LAB
ANION GAP SERPL CALCULATED.3IONS-SCNC: 5 MMOL/L (ref 3–14)
BUN SERPL-MCNC: 14 MG/DL (ref 7–30)
C PEPTIDE SERPL-MCNC: 0.8 NG/ML (ref 0.9–6.9)
CALCIUM SERPL-MCNC: 8.3 MG/DL (ref 8.5–10.1)
CHLORIDE SERPL-SCNC: 107 MMOL/L (ref 94–109)
CHOLEST SERPL-MCNC: 168 MG/DL
CO2 SERPL-SCNC: 26 MMOL/L (ref 20–32)
CREAT SERPL-MCNC: 0.64 MG/DL (ref 0.66–1.25)
GFR SERPL CREATININE-BSD FRML MDRD: >90 ML/MIN/{1.73_M2}
GLUCOSE BLDC GLUCOMTR-MCNC: 120 MG/DL (ref 70–99)
GLUCOSE BLDC GLUCOMTR-MCNC: 126 MG/DL (ref 70–99)
GLUCOSE BLDC GLUCOMTR-MCNC: 126 MG/DL (ref 70–99)
GLUCOSE BLDC GLUCOMTR-MCNC: 127 MG/DL (ref 70–99)
GLUCOSE BLDC GLUCOMTR-MCNC: 133 MG/DL (ref 70–99)
GLUCOSE BLDC GLUCOMTR-MCNC: 133 MG/DL (ref 70–99)
GLUCOSE BLDC GLUCOMTR-MCNC: 137 MG/DL (ref 70–99)
GLUCOSE BLDC GLUCOMTR-MCNC: 143 MG/DL (ref 70–99)
GLUCOSE BLDC GLUCOMTR-MCNC: 157 MG/DL (ref 70–99)
GLUCOSE BLDC GLUCOMTR-MCNC: 157 MG/DL (ref 70–99)
GLUCOSE BLDC GLUCOMTR-MCNC: 161 MG/DL (ref 70–99)
GLUCOSE BLDC GLUCOMTR-MCNC: 169 MG/DL (ref 70–99)
GLUCOSE BLDC GLUCOMTR-MCNC: 173 MG/DL (ref 70–99)
GLUCOSE BLDC GLUCOMTR-MCNC: 190 MG/DL (ref 70–99)
GLUCOSE BLDC GLUCOMTR-MCNC: 192 MG/DL (ref 70–99)
GLUCOSE SERPL-MCNC: 144 MG/DL (ref 70–99)
HDLC SERPL-MCNC: 28 MG/DL
LDLC SERPL CALC-MCNC: ABNORMAL MG/DL
LDLC SERPL DIRECT ASSAY-MCNC: 53 MG/DL
MAGNESIUM SERPL-MCNC: 1.9 MG/DL (ref 1.6–2.3)
NONHDLC SERPL-MCNC: 140 MG/DL
POTASSIUM SERPL-SCNC: 3.7 MMOL/L (ref 3.4–5.3)
SODIUM SERPL-SCNC: 138 MMOL/L (ref 133–144)
TRIGL SERPL-MCNC: 684 MG/DL

## 2019-06-28 PROCEDURE — 00000146 ZZHCL STATISTIC GLUCOSE BY METER IP

## 2019-06-28 PROCEDURE — 83735 ASSAY OF MAGNESIUM: CPT | Performed by: INTERNAL MEDICINE

## 2019-06-28 PROCEDURE — 99232 SBSQ HOSP IP/OBS MODERATE 35: CPT | Performed by: INTERNAL MEDICINE

## 2019-06-28 PROCEDURE — 36415 COLL VENOUS BLD VENIPUNCTURE: CPT | Performed by: INTERNAL MEDICINE

## 2019-06-28 PROCEDURE — 25000131 ZZH RX MED GY IP 250 OP 636 PS 637: Performed by: PHYSICIAN ASSISTANT

## 2019-06-28 PROCEDURE — 25000125 ZZHC RX 250: Performed by: EMERGENCY MEDICINE

## 2019-06-28 PROCEDURE — 80048 BASIC METABOLIC PNL TOTAL CA: CPT | Performed by: INTERNAL MEDICINE

## 2019-06-28 PROCEDURE — 12000001 ZZH R&B MED SURG/OB UMMC

## 2019-06-28 RX ADMIN — INSULIN GLARGINE 40 UNITS: 100 INJECTION, SOLUTION SUBCUTANEOUS at 09:17

## 2019-06-28 RX ADMIN — HUMAN INSULIN 4 UNITS/HR: 100 INJECTION, SOLUTION SUBCUTANEOUS at 01:16

## 2019-06-28 RX ADMIN — INSULIN ASPART 10 UNITS: 100 INJECTION, SOLUTION INTRAVENOUS; SUBCUTANEOUS at 13:43

## 2019-06-28 RX ADMIN — Medication: at 09:31

## 2019-06-28 ASSESSMENT — ACTIVITIES OF DAILY LIVING (ADL)
ADLS_ACUITY_SCORE: 10

## 2019-06-28 NOTE — PROGRESS NOTES
[facility] Internal Medicine Progress Note            Interval History:   Record reviewed.  Seen with RN and interpretor.   Remains on insulin infusion and carb coverage for meals and snacks (adjusted by diabetes service).  BG's 120-130s.  In general feeling well.  Improved po intake.  Resolution of polyuria and polydipsia.  Up  without LH.  No CP, SOB, cough, nausea, reflux, abd pain or distention.  BM today.  Voiding OK.           Medications:   All medications reviewed today          Physical Exam:   Blood pressure 105/60, pulse 64, temperature 96.6  F (35.9  C), temperature source Oral, resp. rate 16, weight 76.7 kg (169 lb), SpO2 97 %.  No intake or output data in the 24 hours ending 06/27/19 0852    General:  Alert.  Appropriate.  No distress.  No O2.     Heent:      Neck:    Skin:    Chest:  clear    Cardiac:  Reg without gallop, murmur.  No JVD.     Abdomen:  Non distended, soft, non-tender.  BS normal.     Extremities:  Perfused.  No edema, calf, posterior thigh tenderness to suggest DVT.     Neuro:            Data:     Results for orders placed or performed during the hospital encounter of 06/26/19 (from the past 24 hour(s))   Glucose by meter   Result Value Ref Range    Glucose 281 (H) 70 - 99 mg/dL   Glucose by meter   Result Value Ref Range    Glucose 339 (H) 70 - 99 mg/dL   Glucose by meter   Result Value Ref Range    Glucose 137 (H) 70 - 99 mg/dL   Glucose by meter   Result Value Ref Range    Glucose 137 (H) 70 - 99 mg/dL   Glucose by meter   Result Value Ref Range    Glucose 150 (H) 70 - 99 mg/dL   Glucose by meter   Result Value Ref Range    Glucose 65 (L) 70 - 99 mg/dL   Glucose by meter   Result Value Ref Range    Glucose 130 (H) 70 - 99 mg/dL   Glucose by meter   Result Value Ref Range    Glucose 174 (H) 70 - 99 mg/dL   Glucose by meter   Result Value Ref Range    Glucose 180 (H) 70 - 99 mg/dL   Glucose by meter   Result Value Ref Range    Glucose 138 (H) 70 - 99 mg/dL   Glucose by meter    Result Value Ref Range    Glucose 185 (H) 70 - 99 mg/dL   Glucose by meter   Result Value Ref Range    Glucose 153 (H) 70 - 99 mg/dL   Glucose by meter   Result Value Ref Range    Glucose 122 (H) 70 - 99 mg/dL   Glucose by meter   Result Value Ref Range    Glucose 107 (H) 70 - 99 mg/dL   Glucose by meter   Result Value Ref Range    Glucose 134 (H) 70 - 99 mg/dL   Glucose by meter   Result Value Ref Range    Glucose 173 (H) 70 - 99 mg/dL   Glucose by meter   Result Value Ref Range    Glucose 192 (H) 70 - 99 mg/dL   Glucose by meter   Result Value Ref Range    Glucose 157 (H) 70 - 99 mg/dL   Glucose by meter   Result Value Ref Range    Glucose 133 (H) 70 - 99 mg/dL   Glucose by meter   Result Value Ref Range    Glucose 126 (H) 70 - 99 mg/dL   Glucose by meter   Result Value Ref Range    Glucose 126 (H) 70 - 99 mg/dL   Glucose by meter   Result Value Ref Range    Glucose 120 (H) 70 - 99 mg/dL   Glucose by meter   Result Value Ref Range    Glucose 157 (H) 70 - 99 mg/dL   Basic metabolic panel   Result Value Ref Range    Sodium 138 133 - 144 mmol/L    Potassium 3.7 3.4 - 5.3 mmol/L    Chloride 107 94 - 109 mmol/L    Carbon Dioxide 26 20 - 32 mmol/L    Anion Gap 5 3 - 14 mmol/L    Glucose 144 (H) 70 - 99 mg/dL    Urea Nitrogen 14 7 - 30 mg/dL    Creatinine 0.64 (L) 0.66 - 1.25 mg/dL    GFR Estimate >90 >60 mL/min/[1.73_m2]    GFR Estimate If Black >90 >60 mL/min/[1.73_m2]    Calcium 8.3 (L) 8.5 - 10.1 mg/dL   Magnesium   Result Value Ref Range    Magnesium 1.9 1.6 - 2.3 mg/dL   Glucose by meter   Result Value Ref Range    Glucose 143 (H) 70 - 99 mg/dL                Assessment and Plan:   1.  Marked hyperglycemia consistent with newly diagnosed diabetes mellitus.  Ongoing issue with A1c 11.2.  Doing well on insulin drip.  2.  Constellation of weight loss, polyuria, polydipsia and fatigue secondary to #1.   3.  Elevated serum osmolality secondary to osmotic diuresis in association with volume depletion.   4.   Hyponatremia, consistent with lab alteration due to degree of hyperglycemia.  Resolved.   5.  Hyperlipidemia, aggravated by #1.    6. General good health.     PLAN:  1)  Transition today off insulin infusion.  Diabetic education.  2)  Discharge when treatment plan in place and patient comfortable with management.   Disposition Plan   Expected discharge in 1 days to prior living arrangement once diabetic management in place as above.        Entered: Maikel Rodriguez 06/28/2019, 9:37 AM              Attestation:  I have reviewed today's vital signs, notes, medications, labs and imaging.     Maikel Rodriguez MD

## 2019-06-28 NOTE — PLAN OF CARE
D: Pt  A/O x3. VSS . Lungs- CL, no c/o chest pain/ SOB. sats=97  % RA.  . Bowel+. PP- +. No edema. CMS- intact. Pt taking PO REG food/ fluids well. Voiding Good amounts. Pain/CAPA - denies. Pt on insulin Gtt. ( see EMAR). BG's con't  q 1 hr. Algorithm #2. IVF's -con't TKO for GTT.Pt up walks INDEPENDENTLY. Walked halls with family.  A: con't to monitor. Call light in reach. Pt able to make needs known.

## 2019-06-28 NOTE — CONSULTS
Diabetes Education  REceived consult request to see this 39 year old male for diabetes education.  Patient presented with symptoms of polyuria, polydipsia and fatigue, with glucose of >600 mg/dL.  Patient being transitioned off of IV insulin today, received 40 units insulin glargine this am.  He also has orders for insulin aspart (1 unit/6 grams CHO, and high correction scale.    Met with patient and professional [place] .  Patient states is feeling much better today.    Discussed types of insulin, action times,  and when given.    Demonstrated use of insulin pen and injection technique.  Patient able to successfully do a return demonstration, using training pen, home pen needle and injection pad.  Encouraged him to practice insulin administration, with nursing supervision.    Demonstrated use of an Accu-chek Guide blood glucose monitor kit.  Patient able to successfully complete a return demonstration.  Discussed testing before meals and at bedtime.  Discussed blood glucose target range of  mg/dL.  Patient asking what to do if blood glucose is higher or lower than the target range.  Discussed use of correction scale for high blood glucose.  Discussed signs/symptoms of hypoglycemia and treatment using 15 grams CHO, such as 1/2 cup juice or 8 ounces of milk.  He stated understanding.    Patient asking questions about diet.  He ate only eggs for breakfast this morning.  He thought if he didn't eat carbohydrate, he then could control his blood glucose. Discussed basic concepts of healthy eating, with a balance of protein, fat and carbohydrate.  He asked questions such as if fruit or avocado have carbohydrate.    Discussed with ARETHA Calvert, diabetes management team.  Would recommend a fixed meal dose for home, also to emphasize that he needs to eat some carbohydrate with each meal.  He may be able to implement use of a correction scale along with the fixed meal dose.    He will benefit from further  outpatient diabetes education, particularly focusing on nutrition education.    When discharged, will need prescriptions for:  1.  Accu-chek Guide test strips  2.  Accu-chek FastClix lancet drums  3.  Insulin pen needles (4mm, 32 gauge)  4.  Sharps container  5.  Alcohol swabs    Mimi Ha MS, APRN, CNS, CDE, CDTC  [phone]

## 2019-06-28 NOTE — PLAN OF CARE
VS:   /60 (BP Location: Left arm)   Pulse 64   Temp 96.6  F (35.9  C) (Oral)   Resp 16   Wt 76.7 kg (169 lb)   SpO2 97%   BMI 28.12 kg/m       Output:   Spontaneously voiding  Independent    Lungs LS clear equal bilaterally    Activity:   Independent    Skin: Intact    Pain:   denies   Neuro/CMS:   A & O x4    Dressing(s):   None    Diet:   Continuous carb count. Diabetic diet    LDA:   PIV SL    Equipment:   none   Plan:   Continue POC, continue diabetic teaching   Continue to monitor BG    Additional Info:    used at bedside

## 2019-06-28 NOTE — PLAN OF CARE
Pt up independently in hallway usually with family. Insulin pump running on algorithm 2, with Carb counting for meals. Lungs clear and BS+, LBM 6/27/19. Denies pain. VSS. Pt slept most of the night and is compliant with cares. Able to make needs known and call light within reach. Will continue to monitor.    97.7

## 2019-06-28 NOTE — PROGRESS NOTES
Diabetes Consult Daily  Progress Note          Assessment/Plan:     Mr. Casi Villatoro is a 40 yo man with no prior history of diabetes, who was admitted on 6/26/19 for evaluation of polyuria, polydipsia, and fatigue and found to be hyperglycemic.  New diagnosis diabetes, uncertain if type 1 or type 2 at this time.    IV insulin rates mostly around 2units/h overnight.      Plan:  Transitioned to subcutaneous insulin this morning:  -glargine 40 units q24h, with IV Insulin to stop 2 hours later.  -aspart for meals and snacks: continue 1unit/6g CHO-- will determine fixed dose at discharge.  -aspart for correction: high intensity ac and hs starting at lunch.  -monitor glucose ac, hs and 0200    We will continue to follow.     Outpatient diabetes follow up: recommend pt establish with PCP and endocrinologist within 1-2 weeks of discharge.  Plan discussed with patient, bedside RN, and CDE.           Interval History:     The last 24 hours progress and nursing notes reviewed.  Mr. Villatoro is feeling ok today- no complaints.  IV insulin rates overnight 1-3 units/h, mostly around 2units/h.  He missed carb coverage this morning but only had eggs for bfast.  He met with RD yesterday, they recommend fixed dose for meals at discharge.  Pt is just starting to learn what foods contain carbs.  He seemed to really start to restrict carbs after talking with our team and RD yesterday- today we discussed he will need some amount of carbs with each meal (since planning for fixed meal dose).  He met with DM educator, and this went well, this morning.    Cpeptide results are below normal (0.8) BUT he was on IV insulin at the time that this was drawn and BG had dropped to 65 at the time of Cpeptide draw-- so not accurate.  RICHY antibody is still pending.    PTA Regimen: none, new diagnosis this admission.      Recent Labs   Lab 06/28/19  1023 06/28/19  0919 06/28/19  0836 06/28/19  0818 06/28/19  0719 06/28/19  0613  06/28/19  0515  06/27/19  0538  06/26/19 1952 06/26/19  1305   GLC  --   --   --  144*  --   --   --   --  144*  --  236*  --  684*   * 161* 143*  --  157* 120* 126*   < >  --    < >  --    < >  --     < > = values in this interval not displayed.               Review of Systems:   See interval hx          Medications:     Orders Placed This Encounter      Combination Diet Regular Diet Adult    Physical Exam:  Gen: Alert, resting in bed, in NAD. Family members and  present.  HEENT: NC/AT, mucous membranes are moist  Resp: Unlabored  Neuro:oriented x3, communicating clearly  /60 (BP Location: Left arm)   Pulse 64   Temp 96.6  F (35.9  C) (Oral)   Resp 16   Wt 76.7 kg (169 lb)   SpO2 97%   BMI 28.12 kg/m             Data:     Lab Results   Component Value Date    A1C 11.1 06/27/2019    A1C 11.2 06/26/2019              CBC RESULTS:   Recent Labs   Lab Test 06/26/19  1305   WBC 4.9   RBC 5.28   HGB 15.5   HCT 45.9   MCV 87   MCH 29.4   MCHC 33.8   RDW 13.2   *     Recent Labs   Lab Test 06/28/19  0818 06/27/19  0538    139   POTASSIUM 3.7 3.3*   CHLORIDE 107 105   CO2 26 29   ANIONGAP 5 5   * 144*   BUN 14 14   CR 0.64* 0.70   LAWRENCE 8.3* 8.1*     Liver Function Studies -   Recent Labs   Lab Test 06/26/19  1305   PROTTOTAL 8.2   ALBUMIN 4.0   BILITOTAL 0.8   ALKPHOS 71   AST Unsatisfactory specimen - hemolyzed   ALT 63     No results found for: INR      I spent a total of 35 minutes bedside and on the inpatient unit managing the glycemic care of Casi Villatoro. Over 50% of my time on the unit was spent counseling the patient and/or coordinating care regarding transition from IV to subcutaneous insulin, reviewing plan with pt, answering questions on different types of diabetes.  See note for details.    Margaret Stewart PA-C [phone]  Diabetes Management job code [id number]

## 2019-06-29 VITALS
BODY MASS INDEX: 28.12 KG/M2 | DIASTOLIC BLOOD PRESSURE: 49 MMHG | HEART RATE: 70 BPM | OXYGEN SATURATION: 98 % | SYSTOLIC BLOOD PRESSURE: 103 MMHG | RESPIRATION RATE: 16 BRPM | TEMPERATURE: 97.1 F | WEIGHT: 169 LBS

## 2019-06-29 LAB
GAD65 AB SER IA-ACNC: <5 IU/ML (ref 0–5)
GLUCOSE BLDC GLUCOMTR-MCNC: 150 MG/DL (ref 70–99)
GLUCOSE BLDC GLUCOMTR-MCNC: 166 MG/DL (ref 70–99)
GLUCOSE BLDC GLUCOMTR-MCNC: 208 MG/DL (ref 70–99)

## 2019-06-29 PROCEDURE — 99207 ZZC CDG-CODE INCORRECT PER BILLING BASED ON TIME: CPT | Performed by: INTERNAL MEDICINE

## 2019-06-29 PROCEDURE — 99238 HOSP IP/OBS DSCHRG MGMT 30/<: CPT | Performed by: INTERNAL MEDICINE

## 2019-06-29 PROCEDURE — 00000146 ZZHCL STATISTIC GLUCOSE BY METER IP

## 2019-06-29 RX ADMIN — INSULIN ASPART 6 UNITS: 100 INJECTION, SOLUTION INTRAVENOUS; SUBCUTANEOUS at 09:34

## 2019-06-29 RX ADMIN — INSULIN GLARGINE 40 UNITS: 100 INJECTION, SOLUTION SUBCUTANEOUS at 07:58

## 2019-06-29 ASSESSMENT — ACTIVITIES OF DAILY LIVING (ADL)
ADLS_ACUITY_SCORE: 10

## 2019-06-29 NOTE — PLAN OF CARE
Pt. discharged 1430 to HOME, was transported by a friend, and left with personal belongings. Pt. received complete discharge paperwork and ALL medications as filled by discharge pharmacy. Pt. was given times of last dose for all discharge medications in writing on discharge medication sheets. Discharge teaching included all medication, pain management, and signs and symptoms of infection. Glucose monitor and supplies sent with patient. Pt. to follow up with Primary in 1 week and endocrinology in 2-3 weeks. Pt. had no further questions at the time of discharge and no unmet needs were identified.

## 2019-06-29 NOTE — PLAN OF CARE
VS: Vital signs:  Temp: 96.9  F (36.1  C) Temp src: Oral BP: 111/69 Pulse: 67   Resp: 16 SpO2: 98 % O2 Device: None (Room air)               O2: Stable room air. Denies SOB   Output: Voids spontaneously without difficultly    Last BM:    Activity: Independent in room   Skin: Intact    Pain: Denies    CMS: intact   Dressing: N/a   Diet: Consistent carb   LDA: R PIV SL   Equipment:    Plan: Discharge tomorrow    Additional Info: Blood sugar at dinner 133, HS, 127. Did not cover at bedtime.  Dinner insulin not given, pt couldn't exactly communicate all of what he ate, he did say grilled fish and chicken, which contained no grams of carbs when I looked them up.  Told pt to try write down what he eats next time.

## 2019-06-29 NOTE — PLAN OF CARE
VS:    /69 (BP Location: Left arm)   Pulse 67   Temp 96.9  F (36.1  C) (Oral)   Resp 16   Wt 76.7 kg (169 lb)   SpO2 98%   BMI 28.12 kg/m        Output:    Voiding w/out difficulty    Lungs LS clear equal bilaterally    Activity:    Independent    Skin: Intact    Pain:    denies   Neuro/CMS:    A & O x4    Dressing(s):    None    Diet:    Continuous carb count, tolerating well    LDA:    PIV SL    Equipment:    none   Plan:    Continue POC, continue diabetic teaching   Continue to monitor BG    Additional Info:

## 2019-06-29 NOTE — PROGRESS NOTES
[facility] Internal Medicine Progress Note            Interval History:   Record reviewed.  Seen with RN and interpretor. Transitioned 6/28 to lantus and prandial novolog.    In general feeling well.  Improved po intake.  Resolution of polyuria and polydipsia.  Up  without LH.  No CP, SOB, cough, nausea, reflux, abd pain or distention.  BM 6/28.  Voiding OK.           Medications:   All medications reviewed today          Physical Exam:   Blood pressure 103/49, pulse 70, temperature 97.1  F (36.2  C), temperature source Oral, resp. rate 16, weight 76.7 kg (169 lb), SpO2 98 %.  No intake or output data in the 24 hours ending 06/27/19 0852    General:  Alert.  Appropriate.  No distress.  No O2.     Heent:      Neck:    Skin:    Chest:  clear    Cardiac:  Reg without gallop, murmur.  No JVD.     Abdomen:  Non distended, soft, non-tender.  BS normal.     Extremities:  Perfused.  No edema, calf, posterior thigh tenderness to suggest DVT.     Neuro:            Data:     Results for orders placed or performed during the hospital encounter of 06/26/19 (from the past 24 hour(s))   Glucose by meter   Result Value Ref Range    Glucose 190 (H) 70 - 99 mg/dL   Glucose by meter   Result Value Ref Range    Glucose 133 (H) 70 - 99 mg/dL   Glucose by meter   Result Value Ref Range    Glucose 127 (H) 70 - 99 mg/dL   Glucose by meter   Result Value Ref Range    Glucose 150 (H) 70 - 99 mg/dL   Glucose by meter   Result Value Ref Range    Glucose 166 (H) 70 - 99 mg/dL     Last Comprehensive Metabolic Panel:  Sodium   Date Value Ref Range Status   06/28/2019 138 133 - 144 mmol/L Final     Potassium   Date Value Ref Range Status   06/28/2019 3.7 3.4 - 5.3 mmol/L Final     Chloride   Date Value Ref Range Status   06/28/2019 107 94 - 109 mmol/L Final     Carbon Dioxide   Date Value Ref Range Status   06/28/2019 26 20 - 32 mmol/L Final     Anion Gap   Date Value Ref Range Status   06/28/2019 5 3 - 14 mmol/L Final     Glucose   Date  Value Ref Range Status   06/28/2019 144 (H) 70 - 99 mg/dL Final     Urea Nitrogen   Date Value Ref Range Status   06/28/2019 14 7 - 30 mg/dL Final     Creatinine   Date Value Ref Range Status   06/28/2019 0.64 (L) 0.66 - 1.25 mg/dL Final     GFR Estimate   Date Value Ref Range Status   06/28/2019 >90 >60 mL/min/[1.73_m2] Final     Comment:     Non  GFR Calc  Starting 12/18/2018, serum creatinine based estimated GFR (eGFR) will be   calculated using the Chronic Kidney Disease Epidemiology Collaboration   (CKD-EPI) equation.       Calcium   Date Value Ref Range Status   06/28/2019 8.3 (L) 8.5 - 10.1 mg/dL Final                Assessment and Plan:   1.  Marked hyperglycemia consistent with newly diagnosed diabetes mellitus.  Ongoing issue with A1c 11.2.  Doing well on lantus and prandial novolog.   2.  Constellation of weight loss, polyuria, polydipsia and fatigue secondary to #1.   3.  Elevated serum osmolality secondary to osmotic diuresis in association with volume depletion.   4.  Hyponatremia, consistent with lab alteration due to degree of hyperglycemia.  Resolved.   5.  Hyperlipidemia, aggravated by #1.    6. General good health.     PLAN:  1)  Reviewed with diabetes service - OK to discharge on lantus 40 units AM and novolog 5-10 units with meal.  2)  Meds, orders completed.  PMD follow up in the week.  Patient has family at home.  Letter for patient indicating need for hospital stay.   Disposition Plan   Expected discharge today to prior living arrangement.     Entered: Maikel Rodriguez 06/29/2019, 11:59 AM              Attestation:  I have reviewed today's vital signs, notes, medications, labs and imaging.     Maikel Rodriguez MD

## 2019-06-29 NOTE — PROGRESS NOTES
Diabetes Consult Daily  Progress Note          Assessment/Plan:     Mr. Casi Villatoro is a 38 yo man with no prior history of diabetes, who was admitted on 6/26/19 for evaluation of polyuria, polydipsia, and fatigue and found to be hyperglycemic.  New diagnosis diabetes, uncertain if type 1 or type 2 at this time.    BG running 120s-160s on subcutaneous insulin.  Discharging today.    Plan for hospital:  -glargine 40 units qAM  -aspart for meals and snacks: continue 1unit/6g CHO  -aspart for correction: high intensity ac and hs.  -monitor glucose ac, hs and 0200    Plan for home (included in AVS):  -Lantus (glargine) 40 units every morning  -Novolog (aspart): 10 units for a meal with moderate amount of carbohydrate, 5 units for a meal with a small amount of carbohydrate, and 0 units (none) if meal has no carbohydrate (beef, chicken, fish, eggs)  -Follow up with primary care provider within 1 week.  Phone number for endocrinology clinic included in AVS- recommend calling to schedule appointment.    Plan discussed with patient, bedside RN, and Dr. Rodriguez.           Interval History:     The last 24 hours progress and nursing notes reviewed.  Mr. Villatoro is feeling well today.  His glucoses have been running 120s-160s since transitioning off IV insulin yesterday.  His carb intake at meals has been quite variable.  Despite telling him to try to eat carbs at each meal yesterday morning, he did not have any carbs with dinner (per notes, appeared he just had chicken and fish).  At lunch he had 60g CHO.  We reviewed that carbohydrates are foods that are starchy or sweet.  We decided it would be best to have a plan for meals that contain no carbs, small amount of carbs and moderate amount of carbs.      Cpeptide results are below normal (0.8) BUT he was on IV insulin at the time that this was drawn and BG had dropped to 65 at the time of Cpeptide draw-- so not accurate.  RICHY antibody is still pending.   Reviewed this again with pt.    PTA Regimen: none, new diagnosis this admission.      Recent Labs   Lab 06/29/19  0754 06/29/19  0202 06/28/19  2135 06/28/19  1905 06/28/19  1342 06/28/19  1125  06/28/19  0818  06/27/19  0538  06/26/19  1952  06/26/19  1305   GLC  --   --   --   --   --   --   --  144*  --  144*  --  236*  --  684*   * 150* 127* 133* 190* 169*   < >  --    < >  --    < >  --    < >  --     < > = values in this interval not displayed.               Review of Systems:   See interval hx          Medications:     Orders Placed This Encounter      Combination Diet Regular Diet Adult    Physical Exam:  Gen: Alert, sitting up chair, NAD,  present.  HEENT: NC/AT, mucous membranes are moist  Resp: Unlabored  Neuro:oriented x3, communicating clearly  /49 (BP Location: Left arm)   Pulse 70   Temp 97.1  F (36.2  C) (Oral)   Resp 16   Wt 76.7 kg (169 lb)   SpO2 98%   BMI 28.12 kg/m             Data:     Lab Results   Component Value Date    A1C 11.1 06/27/2019    A1C 11.2 06/26/2019            Recent Labs   Lab Test 06/28/19  0818 06/27/19  0538    139   POTASSIUM 3.7 3.3*   CHLORIDE 107 105   CO2 26 29   ANIONGAP 5 5   * 144*   BUN 14 14   CR 0.64* 0.70   LAWRENCE 8.3* 8.1*     CBC RESULTS:   Recent Labs   Lab Test 06/26/19  1305   WBC 4.9   RBC 5.28   HGB 15.5   HCT 45.9   MCV 87   MCH 29.4   MCHC 33.8   RDW 13.2   *       I spent a total of 35 minutes bedside and on the inpatient unit managing the glycemic care of Casi Villatoro. Over 50% of my time on the unit was spent counseling the patient and/or coordinating care regarding glucose trend on subcutaneous insulin, plan for home (reviewed in detail with ), reviewing labs results thus far, discussing follow up.  See note for details.    Margaret Stewart PA-C [phone]  Diabetes Management job code [id number]

## 2019-06-29 NOTE — PLAN OF CARE
Up ad edwin.HSBG 127,2AM .Denies problems with both BM and bladder status.PIV line saline locked.Denies any pain.Possibly discharge home today.

## 2019-06-29 NOTE — DISCHARGE INSTRUCTIONS
DIABETES- INSULIN PLAN FOR HOME:  -Lantus (glargine) 40 units every morning  -Novolog (aspart): 10 units for a meal with moderate amount of carbohydrate, 5 units for a meal with a small amount of carbohydrate, and 0 units (none) if meal has no carbohydrate (beef, chicken, fish, eggs)  -Follow up with your primary care provider within 1 week.   -Diabetes follow-up: Call [facility] coordinator: [phone], to schedule your outpatient diabetes appointment.  Recommend you be seen 2-3 weeks from discharge.

## 2019-06-29 NOTE — PROGRESS NOTES
June 29, 2019    To whom it may concern:    Casi Villatoro was hospitalized at [facility] from 6/26/19 through 6/29/19 due to   medical illness.  As a result his wife, Fartun Tran, missed work as she was required to stay home to provide .       Maikel Rodriguez M.D.       Internal Medicine Hospital Service  [facility].

## 2019-06-29 NOTE — PLAN OF CARE
VS:   /49 (BP Location: Left arm)   Pulse 70   Temp 97.1  F (36.2  C) (Oral)   Resp 16   Wt 76.7 kg (169 lb)   SpO2 98%   BMI 28.12 kg/m    Last    Output:   Voiding Spontaneously. Independent    Lungs LS clear equal bilaterally    Activity:   Independent    Skin: Intact    Pain:   Denies    Neuro/CMS:   A&Ox4      Dressing(s):   none   Diet:   Diabetic diet. Carb count    LDA:   PIV R SL    Equipment:   Glucose monitor    Plan:   Discharge today. Continue monitoring Blood glucose    Additional Info:    at bedside with MD and Diabetes team.

## 2019-06-30 ENCOUNTER — PATIENT OUTREACH (OUTPATIENT)
Dept: CARE COORDINATION | Facility: CLINIC | Age: 39
End: 2019-06-30

## 2019-06-30 NOTE — DISCHARGE SUMMARY
Admit Date:     06/26/2019   Discharge Date:     06/29/2019      FINAL DIAGNOSES:   1.  Diabetes (newly diagnosed), uncertain if type 1 or type 2.   2.  Polyuria, polydipsia and weight loss secondary to #1.   3.  Elevated serum osmolality secondary to #1, resolved.   4.  Hyponatremia, consistent with lab alteration due to marked hyperglycemia.  Normalized.   5.  Hyperlipidemia with triglyceride elevation, aggravated by #1.   6.  General good health.      HISTORY:  A pleasant, generally healthy 39-year-old male admitted to the medical service through the emergency department where he presented with constellation of urinary frequency, increased thirst and fatigue with documented marked hyperglycemia with blood sugars in excess of 600, consistent with newly-diagnosed diabetes mellitus.      The patient was in his usual state of health until approximately 1 month prior to admission, when the above symptoms began.  No correlating infectious symptoms.  No new meds.  No prior knowledge regarding diabetes.  No family history of diabetes.  ER evaluation demonstrated a blood glucose of 684, improving to 385 after 2 liters of normal saline.      LABORATORY DATA:  A complete metabolic profile demonstrated hyponatremia with sodium 129, potassium 4.8, BUN of 15, creatinine of 0.68.  Normal CO2 of 28.  Normal anion gap of 2.  Normal liver profile.  Ketones 0.2.  Osmolality elevated at 326.  Normal white count of 4900 with hemoglobin of 15.5 grams percent, platelet count mildly reduced at 148,000.  A urinalysis was bland except for glucosuria.  The patient started on insulin infusion using adult protocol with admission to the medical unit.      HOSPITAL COURSE:  The patient remained clinically stable throughout his hospital course.  On the above insulin infusion, patient noted a dramatic improvement in symptoms of polyuria and polydipsia, correlating with blood sugar improvement into the low to mid 100 range.  Followup sodium  normalized at 139.  Low potassium of 3.3, which was replaced, normalizing at 3.7 on 06/28/2019.  Renal function remained stable with BUN of 14, creatinine 0.64.  Magnesium normal at 1.9.      The patient seen in consultation by [facility] Diabetes Service.  Follow up with ARETHA Calvert.  Transitioned off insulin infusion the morning of 06/28.  Placed on Lantus 40 units subcu q.a.m. with NovoLog carb coverage for meals and snacks.  Diabetic education as well as diet instruction.  Concern regarding patient's ability to count carbohydrates and adjust insulin accordingly.  Decision thus to place patient on a fixed dose of NovoLog t.i.d. with meals.  By the morning of 06/29/2019, patient continued to do well clinically.  Blood glucose in the 150-208 range.  Patient anxious for discharge.  Felt appropriate by Diabetes Service.  Decision to go home on Lantus 40 units subcu q.a.m. with NovoLog 5 units small meal and 10 units large meal.  No snack coverage or correction scale.  Clinically, the patient was feeling well.  Improved p.o. intake.  Resolution of polyuria and polydipsia as above.  Ambulatory without postural lightheadedness.  No cardiopulmonary or GI complaints.  Bowel movement 06/28.  No voiding complaints.  Stable hemodynamics with blood pressure 103/49, heart rate in the 70s, temperature normal, O2 sat 98% room air.      He is doing well and appearing to understand recommendations on how to add manage his diabetes including insulin administration and blood glucose monitoring.  The patient was felt to be stable for discharge home.  He resides with a number of family members who could monitor patient's status.  At time of discharge, it was unclear whether patient was a type 2 or type 1 diabetic.  Evaluated with C. peptide, which was mildly low at 0.8; however, this was not felt to be confirmatory.  Glutamic acid decarboxylase antibody pending at time of discharge.      DISCHARGE MEDICATIONS:   NovoLog 5-10 units t.i.d. with meals (5 units small meal, 10 units for large meal), Lantus 40 units subcu q.a.m.  Glucometer, insulin needles, lancets, test strips and alcohol wipes provided.      I recommended that patient monitor his blood sugar 4 times daily, before meals and at bed.  Call his primary care provider if blood sugar consistently over 300 or less than 70.      RECOMMENDATION:  The patient follow up with his primary care provider within the week.  Followup lab data to determine if patient is a type 1 or type 2 diabetic.  Endocrine followup recommended if determined to be type 1.      The patient has been employed driving a van.  He was informed that it has to be determined that his diabetic control is stable and that he is compliant with care with regard to driving safety and that he should inform the DMV of his status.      A letter was provided to patient indicating that he was hospitalized at [facility] from 2019 through 2019 due to medical illness.  As a result, his wife missed work and was required to stay home to provide childcare.      CONDITION AT DISCHARGE:  Stable.         LORI CORCORAN MD             D: 2019   T: 2019   MT: LATOSHA      Name:     SARAH ST   MRN:      [id number]        Account:        [id number]   :      1980           Admit Date:     2019                                  Discharge Date: 2019      Document: [id number]       cc: Rafa Dhillon MD

## 2020-02-12 ENCOUNTER — OFFICE VISIT (OUTPATIENT)
Dept: FAMILY MEDICINE | Facility: CLINIC | Age: 40
End: 2020-02-12
Payer: MEDICAID

## 2020-02-12 VITALS
DIASTOLIC BLOOD PRESSURE: 84 MMHG | HEART RATE: 85 BPM | SYSTOLIC BLOOD PRESSURE: 138 MMHG | TEMPERATURE: 98.4 F | OXYGEN SATURATION: 98 %

## 2020-02-12 DIAGNOSIS — E78.6 LOW HDL (UNDER 40): ICD-10-CM

## 2020-02-12 DIAGNOSIS — E78.1 HIGH TRIGLYCERIDES: ICD-10-CM

## 2020-02-12 DIAGNOSIS — E11.69 TYPE 2 DIABETES MELLITUS WITH OTHER SPECIFIED COMPLICATION, UNSPECIFIED WHETHER LONG TERM INSULIN USE (H): Primary | ICD-10-CM

## 2020-02-12 PROBLEM — E11.9 DIABETES MELLITUS, TYPE 2 (H): Status: ACTIVE | Noted: 2020-02-12

## 2020-02-12 LAB — HBA1C MFR BLD: 7.5 % (ref 0–5.6)

## 2020-02-12 PROCEDURE — T1013 SIGN LANG/ORAL INTERPRETER: HCPCS | Mod: U3 | Performed by: NURSE PRACTITIONER

## 2020-02-12 PROCEDURE — 83036 HEMOGLOBIN GLYCOSYLATED A1C: CPT | Performed by: NURSE PRACTITIONER

## 2020-02-12 PROCEDURE — 80053 COMPREHEN METABOLIC PANEL: CPT | Performed by: NURSE PRACTITIONER

## 2020-02-12 PROCEDURE — 36415 COLL VENOUS BLD VENIPUNCTURE: CPT | Performed by: NURSE PRACTITIONER

## 2020-02-12 PROCEDURE — 82043 UR ALBUMIN QUANTITATIVE: CPT | Performed by: NURSE PRACTITIONER

## 2020-02-12 PROCEDURE — 99215 OFFICE O/P EST HI 40 MIN: CPT | Performed by: NURSE PRACTITIONER

## 2020-02-12 RX ORDER — ATORVASTATIN CALCIUM 40 MG/1
40 TABLET, FILM COATED ORAL DAILY
Qty: 90 TABLET | Refills: 3 | Status: SHIPPED | OUTPATIENT
Start: 2020-02-12 | End: 2020-10-14

## 2020-02-12 NOTE — PATIENT INSTRUCTIONS
"  Patient Education   Diabetes ABCs  Work with Your Health Care Team to Manage Diabetes!     A1c (hemoglobin A1c test):  Check at least every 6 months.  Goal without diabetes: Less than 6%  Goal with diabetes: Less than 7%, but your doctor may have a different goal for you.  My Goal: ___________________   BG (blood glucose):  Goals without diabetes:  Before meals: 60 to 99 mg/dL  After meals (2 hours): under 140 mg/dL  Goals with diabetes:   Before meals: 80 to 130 mg/dL  After meals: (2 hours): under 180 mg/dL  My Goals:  Before meals: __________  After meals: ___________   Blood pressure:  Check at every doctor visit.   Goal: Less than 140/90    Cholesterol:   Check at least 1 time a year.  Goals for LDL (\"bad\" cholesterol):  With heart disease: Less than 70 mg/dL  Without heart disease: Less than 100 mg/dL   Eyes:   Have a dilated eye exam every year.   Teeth:   See your dentist twice a year. People with diabetes have a higher risk of gum disease.  Smoking:   If you smoke, it's important to quit. Make a plan with help from your health care team.  Weight:   Work towards a healthy weight with help from your diabetes educator or dietitian.  Kidneys:     Check your urine protein 1 time each year.    Protect your kidneys by keeping your blood pressure normal.  Feet:    Check your feet every day for signs of injury, dry skin, cracks, cuts, swelling, or blisters.    Ask your doctor to check your feet at every visit.    Wear shoes and socks that fit well.    Don't go barefoot.  Sex:   Talk about erectile dysfunction (ED) and female sexual dysfunction (FSD) with your doctor.  For informational purposes only. Not to replace the advice of your health care provider. Copyright   2018 [employer]. All rights reserved. Illustration ID [id number]   [id number]  [employer]. Clinically reviewed by [facility]. [employer] [id number] - 10/18.       Patient Education     Diet: Diabetes  Food is an important " tool that you can use to control diabetes and stay healthy. Eating well-balanced meals in the correct amounts will help you control your blood glucose levels and prevent low blood sugar reactions. It will also help you reduce the health risks of diabetes. There is no one specific diet that is right for everyone with diabetes. But there are general guidelines to follow. A registered dietitian ([employer]) will create a tailored diet approach that s just right for you. He or she will also help you plan healthy meals and snacks. If you have any questions, call your dietitian for advice.     Guidelines for success  Talk with your healthcare provider before starting a diabetes diet or weight loss program. If you haven't talked with a dietitian yet, ask your provider for a referral. The following guidelines can help you succeed:    Select foods from the 6 food groups below. Your dietitian will help you find food choices within each group. He or she will also show you serving sizes and how many servings you can have at each meal.  ? Grains, beans, and starchy vegetables  ? Vegetables  ? Fruit  ? Milk or yogurt  ? Meat, poultry, fish, or tofu  ? Healthy fats    Check your blood sugar levels as directed by your provider. Take any medicine as prescribed by your provider.    Learn to read food labels and pick the right portion sizes.    Eat only the amount of food in your meal plan. Eat about the same amount of food at regular times each day. Don t skip meals. Eat meals 4 to 5 hours apart, with snacks in between.    Limit alcohol. It raises blood sugar levels. Drink water or calorie-free diet drinks that use safe sweeteners.    Eat less fat to help lower your risk of heart disease. Use nonfat or low-fat dairy products and lean meats. Avoid fried foods. Use cooking oils that are unsaturated, such as olive, canola, or peanut oil.    Talk with your dietitian about safe sugar substitutes.    Avoid added salt. It can contribute to high  blood pressure, which can cause heart disease. People with diabetes already have a risk of high blood pressure and heart disease.    Stay at a healthy weight. If you need to lose weight, cut down on your portion sizes. But don t skip meals. Exercise is an important part of any weight management program. Talk with your provider about an exercise program that s right for you.    For more information about the best diet plan for you, talk with a registered dietitian (RD). To find an RD in your area, contact:  ? Academy of Nutrition and Dietetics www.eatright.org  ? The American Diabetes Association [phone] www.diabetes.org  Date Last Reviewed: 8/1/2016 2000-2019 [employer]. [address] [place] [address], [place] [address]. All rights reserved. This information is not intended as a substitute for professional medical care. Always follow your healthcare professional's instructions.           Patient Education     Before You Start Your Diabetes Exercise Plan    Fitness has a special role for people with diabetes. Being fit means getting healthier by adding activity to your day. Talk with your healthcare provider before getting started. He or she may want you to have a checkup before you are more active. Also your provider may want you to have some tests first. This helps you and your provider learn how you will respond to a fitness program.    Your checkup  A checkup helps ensure that your fitness plan will bring you the most benefit. It also lowers the risks that may come with physical activity. As part of your checkup:    You may have a test called a hemoglobin A1C. The A1C test measures your average sugar (glucose) level over 2 to 3 months. A1C is often given as a percentage. But it is now also given as a number that shows your estimated Average Glucose (eAG). The eAG gives you a number that is like the numbers listed on your daily glucose monitor.    Your healthcare provider may check your heart with a  resting electrocardiogram. Diabetes can cause heart problems. But a fitness program can help these problems get better. Being fit can also help improve your cholesterol and blood pressure levels.    Your healthcare provider may check your feet. People with diabetes can have problems with their feet. Your provider can check your feet before you become more active. Take time to find shoes that will support your feet well while you exercise. Wear socks that fit well. Wear socks that pull moisture away from your skin.  If you have an exercise stress test  An exercise stress test can show how your heart responds to activity. It can show what level of exercise is right for you. You will have small electrodes placed on your chest. You will then walk on a treadmill or ride an exercise bike. Your heart rate is checked. If you have this test, your provider will give you more details.  Date Last Reviewed: 12/1/2018 2000-2019 [employer]. [address] [place] [address], [place] [address]. All rights reserved. This information is not intended as a substitute for professional medical care. Always follow your healthcare professional's instructions.

## 2020-02-12 NOTE — LETTER
March 2, 2020      Taiwomagi Villatoro  [address] [place] [address]        Dear ,    We are writing to inform you of your test results.    Please make an appointment with your provider to review or follow up on your test results.  Appointments can be made by calling [phone]; recheck in 3 months     Kidneys might have been slightly affected by your diabetes, liver enzymes slightly elevated. Keep working on better control of the Diabetes, see the nurse educator.       Resulted Orders   Hemoglobin A1c   Result Value Ref Range    Hemoglobin A1C 7.5 (H) 0 - 5.6 %      Comment:      Normal <5.7% Prediabetes 5.7-6.4%  Diabetes 6.5% or higher - adopted from ADA   consensus guidelines.     Comprehensive metabolic panel   Result Value Ref Range    Sodium 136 133 - 144 mmol/L    Potassium 4.3 3.4 - 5.3 mmol/L    Chloride 104 94 - 109 mmol/L    Carbon Dioxide 26 20 - 32 mmol/L    Anion Gap 6 3 - 14 mmol/L    Glucose 237 (H) 70 - 99 mg/dL    Urea Nitrogen 16 7 - 30 mg/dL    Creatinine 0.68 0.66 - 1.25 mg/dL    GFR Estimate >90 >60 mL/min/[1.73_m2]      Comment:      Non  GFR Calc  Starting 12/18/2018, serum creatinine based estimated GFR (eGFR) will be   calculated using the Chronic Kidney Disease Epidemiology Collaboration   (CKD-EPI) equation.      GFR Estimate If Black >90 >60 mL/min/[1.73_m2]      Comment:       GFR Calc  Starting 12/18/2018, serum creatinine based estimated GFR (eGFR) will be   calculated using the Chronic Kidney Disease Epidemiology Collaboration   (CKD-EPI) equation.      Calcium 8.8 8.5 - 10.1 mg/dL    Bilirubin Total 0.4 0.2 - 1.3 mg/dL    Albumin 4.0 3.4 - 5.0 g/dL    Protein Total 7.8 6.8 - 8.8 g/dL    Alkaline Phosphatase 55 40 - 150 U/L    ALT 80 (H) 0 - 70 U/L    AST 26 0 - 45 U/L   Albumin Random Urine Quantitative with Creat Ratio   Result Value Ref Range    Creatinine Urine 99 mg/dL    Albumin Urine mg/L 18 mg/L    Albumin Urine mg/g Cr 18.64 (H)  0 - 17 mg/g Cr       If you have any questions or concerns, please call the clinic at the number listed above.       Sincerely,        NIKKO Anders CNP

## 2020-02-12 NOTE — PROGRESS NOTES
Subjective     Casi Villatoro is a 40 year old male who presents to clinic today for the following health issues:    HPI   Phone  used    Diabetes Follow-up.    How often are you checking your blood sugar? Two times daily  Blood sugar testing frequency justification:  Uncontrolled diabetes  What time of day are you checking your blood sugars (select all that apply)?  Morning and evening, after meals  Have you had any blood sugars above 200?  Yes once in the last 5 months.   Have you had any blood sugars below 70?  No    What symptoms do you notice when your blood sugar is low?  None    What concerns do you have today about your diabetes? None and Other: last visit was July.      Do you have any of these symptoms? (Select all that apply)  No numbness or tingling in feet.  No redness, sores or blisters on feet.  No complaints of excessive thirst.  No reports of blurry vision.  No significant changes to weight.    Have you had a diabetic eye exam in the last 12 months? No    July was sick so stopped taking insulin-lantus 40 units and novolog 5-10 only took for short time  Fairly new diagnosis 6 months ago inpatient--never had hospital follow up   Admits DM knowledge very low   Adjusting well to diagnosis, said no stress or depression  Does NOT want insulin, would rather treat with diet and exercise, he has been walking more  No eye exam yet  Did not get set up with DM educator yet, willing to     High triglycerides above 600, low hdl and normal LDL    BP Readings from Last 2 Encounters:   02/12/20 138/84   06/29/19 103/49     Hemoglobin A1C (%)   Date Value   06/27/2019 11.1 (H)   06/26/2019 11.2 (H)     LDL Cholesterol Calculated (mg/dL)   Date Value   06/27/2019     Cannot estimate LDL when triglyceride exceeds 400 mg/dL   05/02/2018     Cannot estimate LDL when triglyceride exceeds 400 mg/dL     LDL Cholesterol Direct (mg/dL)   Date Value   06/27/2019 53   05/02/2018 65           How many servings of fruits  and vegetables do you eat daily?  0-1    On average, how many sweetened beverages do you drink each day (Examples: soda, juice, sweet tea, etc.  Do NOT count diet or artificially sweetened beverages)?   0.     How many days per week do you exercise enough to make your heart beat faster? 3 or less, hasnt been exercising over the past 3 months. Usually 60 minutes.     How many minutes a day do you exercise enough to make your heart beat faster? 30 - 60  How many days per week do you miss taking your medication? Stopped taking all medication in July    What makes it hard for you to take your medications?  Did not have insurance    PROBLEMS TO ADD ON...  High triglycerides haven't been rechecked, low hdl not taking any meds,     Patient Active Problem List   Diagnosis     CARDIOVASCULAR SCREENING; LDL GOAL LESS THAN 160     Speaks [place], requires      BMI 30.0-30.9,adult     Hyperglycemia     Diabetes mellitus, type 2 (H)     Low HDL (under 40)     High triglycerides     Past Surgical History:   Procedure Laterality Date     NO HISTORY OF SURGERY  2013       Social History     Tobacco Use     Smoking status: Never Smoker     Smokeless tobacco: Never Used   Substance Use Topics     Alcohol use: No     Family History   Problem Relation Age of Onset     Cancer - colorectal No family hx of      Prostate Cancer No family hx of          No Known Allergies  Recent Labs   Lab Test 06/28/19  0818 06/27/19  0859 06/27/19  0538  06/26/19  1630 06/26/19  1305 05/02/18  1455   A1C  --  11.1*  --   --  11.2*  --   --    LDL  --   --  Cannot estimate LDL when triglyceride exceeds 400 mg/dL  53  --   --   --  Cannot estimate LDL when triglyceride exceeds 400 mg/dL  65   HDL  --   --  28*  --   --   --  23*   TRIG  --   --  684*  --   --   --  764*   ALT  --   --   --   --   --  63 52   CR 0.64*  --  0.70   < >  --  0.68 0.70   GFRESTIMATED >90  --  >90   < >  --  >90 >90   GFRESTBLACK >90  --  >90   < >  --  >90 >90    POTASSIUM 3.7  --  3.3*   < >  --  4.8 3.8   TSH  --   --  1.20  --   --   --   --     < > = values in this interval not displayed.      BP Readings from Last 3 Encounters:   02/12/20 138/84   06/29/19 103/49   05/26/19 126/83    Wt Readings from Last 3 Encounters:   06/26/19 76.7 kg (169 lb)   05/26/19 81.2 kg (179 lb)   09/26/18 83 kg (183 lb)                    Reviewed and updated as needed this visit by Provider         Review of Systems   ROS COMP: Constitutional, HEENT, cardiovascular, pulmonary, GI, , musculoskeletal, neuro, skin, endocrine and psych systems are negative, except as otherwise noted.      Objective    /84   Pulse 85   Temp 98.4  F (36.9  C) (Oral)   SpO2 98%   There is no height or weight on file to calculate BMI.  Physical Exam   GENERAL: healthy, alert and no distress  NECK: no adenopathy, no asymmetry, masses, or scars and thyroid normal to palpation  RESP: lungs clear to auscultation - no rales, rhonchi or wheezes  CV: regular rate and rhythm, normal S1 S2, no S3 or S4, no murmur, click or rub, no peripheral edema and peripheral pulses strong  ABDOMEN: soft, nontender, no hepatosplenomegaly, no masses and bowel sounds normal  MS: no gross musculoskeletal defects noted, no edema  SKIN: no suspicious lesions or rashes  NEURO: Normal strength and tone, mentation intact and speech normal  PSYCH: mentation appears normal, affect normal/bright  Diabetic foot exam: normal DP and PT pulses, normal sensory exam, normal monofilament exam and dry cracking heels    Diagnostic Test Results:  Labs reviewed in Epic  No results found for this or any previous visit (from the past 24 hour(s)).        Assessment & Plan       ICD-10-CM    1. Type 2 diabetes mellitus with other specified complication, unspecified whether long term insulin use (H) E11.69 atorvastatin (LIPITOR) 40 MG tablet     Hemoglobin A1c     Comprehensive metabolic panel     Albumin Random Urine Quantitative with Creat Ratio     " AMBULATORY ADULT DIABETES EDUCATOR REFERRAL     metFORMIN (GLUCOPHAGE) 500 MG tablet   2. High triglycerides E78.1    3. Low HDL (under 40) E78.6    pt declined wt, new DM diagnosis went over info as best we could today limited by phone  that couldn't hear well.   Pt not wanting any medications, strongly recommended and discussed risks of uncontrolled DM, he wants to work on exercising and diet more, referred for complete education to do asap    A1C done after visit, pt would like phone call with results, need to do fasting lab work.     Return to Clinic fasting within 1 week to  where we left off today, long visit regarding basic care of DM, dyslipidemia, lifestyle and monitoring sugars also needing improvement      BMI:   Estimated body mass index is 28.12 kg/m  as calculated from the following:    Height as of 5/26/19: 1.651 m (5' 5\").    Weight as of 6/26/19: 76.7 kg (169 lb).   Weight management plan: Discussed healthy diet and exercise guidelines        Patient Instructions       Patient Education   Diabetes ABCs  Work with Your Health Care Team to Manage Diabetes!     A1c (hemoglobin A1c test):  Check at least every 6 months.  Goal without diabetes: Less than 6%  Goal with diabetes: Less than 7%, but your doctor may have a different goal for you.  My Goal: ___________________   BG (blood glucose):  Goals without diabetes:  Before meals: 60 to 99 mg/dL  After meals (2 hours): under 140 mg/dL  Goals with diabetes:   Before meals: 80 to 130 mg/dL  After meals: (2 hours): under 180 mg/dL  My Goals:  Before meals: __________  After meals: ___________   Blood pressure:  Check at every doctor visit.   Goal: Less than 140/90    Cholesterol:   Check at least 1 time a year.  Goals for LDL (\"bad\" cholesterol):  With heart disease: Less than 70 mg/dL  Without heart disease: Less than 100 mg/dL   Eyes:   Have a dilated eye exam every year.   Teeth:   See your dentist twice a year. People with diabetes " have a higher risk of gum disease.  Smoking:   If you smoke, it's important to quit. Make a plan with help from your health care team.  Weight:   Work towards a healthy weight with help from your diabetes educator or dietitian.  Kidneys:     Check your urine protein 1 time each year.    Protect your kidneys by keeping your blood pressure normal.  Feet:    Check your feet every day for signs of injury, dry skin, cracks, cuts, swelling, or blisters.    Ask your doctor to check your feet at every visit.    Wear shoes and socks that fit well.    Don't go barefoot.  Sex:   Talk about erectile dysfunction (ED) and female sexual dysfunction (FSD) with your doctor.  For informational purposes only. Not to replace the advice of your health care provider. Copyright   2018 [employer]. All rights reserved. Illustration ID [id number]   [id number]  [employer]. Clinically reviewed by [facility]. [employer] [id number] - 10/18.       Patient Education     Diet: Diabetes  Food is an important tool that you can use to control diabetes and stay healthy. Eating well-balanced meals in the correct amounts will help you control your blood glucose levels and prevent low blood sugar reactions. It will also help you reduce the health risks of diabetes. There is no one specific diet that is right for everyone with diabetes. But there are general guidelines to follow. A registered dietitian (RD) will create a tailored diet approach that s just right for you. He or she will also help you plan healthy meals and snacks. If you have any questions, call your dietitian for advice.     Guidelines for success  Talk with your healthcare provider before starting a diabetes diet or weight loss program. If you haven't talked with a dietitian yet, ask your provider for a referral. The following guidelines can help you succeed:    Select foods from the 6 food groups below. Your dietitian will help you find food choices within each  group. He or she will also show you serving sizes and how many servings you can have at each meal.  ? Grains, beans, and starchy vegetables  ? Vegetables  ? Fruit  ? Milk or yogurt  ? Meat, poultry, fish, or tofu  ? Healthy fats    Check your blood sugar levels as directed by your provider. Take any medicine as prescribed by your provider.    Learn to read food labels and pick the right portion sizes.    Eat only the amount of food in your meal plan. Eat about the same amount of food at regular times each day. Don t skip meals. Eat meals 4 to 5 hours apart, with snacks in between.    Limit alcohol. It raises blood sugar levels. Drink water or calorie-free diet drinks that use safe sweeteners.    Eat less fat to help lower your risk of heart disease. Use nonfat or low-fat dairy products and lean meats. Avoid fried foods. Use cooking oils that are unsaturated, such as olive, canola, or peanut oil.    Talk with your dietitian about safe sugar substitutes.    Avoid added salt. It can contribute to high blood pressure, which can cause heart disease. People with diabetes already have a risk of high blood pressure and heart disease.    Stay at a healthy weight. If you need to lose weight, cut down on your portion sizes. But don t skip meals. Exercise is an important part of any weight management program. Talk with your provider about an exercise program that s right for you.    For more information about the best diet plan for you, talk with a registered dietitian (RD). To find an RD in your area, contact:  ? Academy of Nutrition and Dietetics www.eatright.org  ? The American Diabetes Association [phone] www.diabetes.org  Date Last Reviewed: 8/1/2016 2000-2019 [employer]. [address] [place] [address], [place] [address]. All rights reserved. This information is not intended as a substitute for professional medical care. Always follow your healthcare professional's instructions.           Patient Education      Before You Start Your Diabetes Exercise Plan    Fitness has a special role for people with diabetes. Being fit means getting healthier by adding activity to your day. Talk with your healthcare provider before getting started. He or she may want you to have a checkup before you are more active. Also your provider may want you to have some tests first. This helps you and your provider learn how you will respond to a fitness program.    Your checkup  A checkup helps ensure that your fitness plan will bring you the most benefit. It also lowers the risks that may come with physical activity. As part of your checkup:    You may have a test called a hemoglobin A1C. The A1C test measures your average sugar (glucose) level over 2 to 3 months. A1C is often given as a percentage. But it is now also given as a number that shows your estimated Average Glucose (eAG). The eAG gives you a number that is like the numbers listed on your daily glucose monitor.    Your healthcare provider may check your heart with a resting electrocardiogram. Diabetes can cause heart problems. But a fitness program can help these problems get better. Being fit can also help improve your cholesterol and blood pressure levels.    Your healthcare provider may check your feet. People with diabetes can have problems with their feet. Your provider can check your feet before you become more active. Take time to find shoes that will support your feet well while you exercise. Wear socks that fit well. Wear socks that pull moisture away from your skin.  If you have an exercise stress test  An exercise stress test can show how your heart responds to activity. It can show what level of exercise is right for you. You will have small electrodes placed on your chest. You will then walk on a treadmill or ride an exercise bike. Your heart rate is checked. If you have this test, your provider will give you more details.  Date Last Reviewed: 12/1/2018 2000-2019  [employer]. [address] [place] [address], [place] [address]. All rights reserved. This information is not intended as a substitute for professional medical care. Always follow your healthcare professional's instructions.               Return in about 1 week (around 2/19/2020) for fasting lab work, Diabetes recheck.    I spent 55 min in direct face to face time with this pt, greater than 50% in counseling and coordination of care of above diagnoses      NIKKO Anders [facility]

## 2020-02-13 ENCOUNTER — TELEPHONE (OUTPATIENT)
Dept: FAMILY MEDICINE | Facility: CLINIC | Age: 40
End: 2020-02-13

## 2020-02-13 LAB
ALBUMIN SERPL-MCNC: 4 G/DL (ref 3.4–5)
ALP SERPL-CCNC: 55 U/L (ref 40–150)
ALT SERPL W P-5'-P-CCNC: 80 U/L (ref 0–70)
ANION GAP SERPL CALCULATED.3IONS-SCNC: 6 MMOL/L (ref 3–14)
AST SERPL W P-5'-P-CCNC: 26 U/L (ref 0–45)
BILIRUB SERPL-MCNC: 0.4 MG/DL (ref 0.2–1.3)
BUN SERPL-MCNC: 16 MG/DL (ref 7–30)
CALCIUM SERPL-MCNC: 8.8 MG/DL (ref 8.5–10.1)
CHLORIDE SERPL-SCNC: 104 MMOL/L (ref 94–109)
CO2 SERPL-SCNC: 26 MMOL/L (ref 20–32)
CREAT SERPL-MCNC: 0.68 MG/DL (ref 0.66–1.25)
CREAT UR-MCNC: 99 MG/DL
GFR SERPL CREATININE-BSD FRML MDRD: >90 ML/MIN/{1.73_M2}
GLUCOSE SERPL-MCNC: 237 MG/DL (ref 70–99)
MICROALBUMIN UR-MCNC: 18 MG/L
MICROALBUMIN/CREAT UR: 18.64 MG/G CR (ref 0–17)
POTASSIUM SERPL-SCNC: 4.3 MMOL/L (ref 3.4–5.3)
PROT SERPL-MCNC: 7.8 G/DL (ref 6.8–8.8)
SODIUM SERPL-SCNC: 136 MMOL/L (ref 133–144)

## 2020-02-13 NOTE — TELEPHONE ENCOUNTER
Diabetes Education Scheduling Outreach #1:    Call to patient to schedule. Left message with phone number to call to schedule.    Plan for 2nd outreach attempt within 2 business days.    Ashely Edwards OnCall  Diabetes and Nutrition Scheduling

## 2020-02-17 ENCOUNTER — APPOINTMENT (OUTPATIENT)
Dept: INTERPRETER SERVICES | Facility: CLINIC | Age: 40
End: 2020-02-17

## 2020-02-17 NOTE — TELEPHONE ENCOUNTER
Diabetes Education Scheduling Outreach #2:    Call to patient to schedule. Voicemail full.    Ashely Edwards OnCall  Diabetes and Nutrition Scheduling

## 2020-09-21 ENCOUNTER — OFFICE VISIT (OUTPATIENT)
Dept: FAMILY MEDICINE | Facility: CLINIC | Age: 40
End: 2020-09-21
Payer: COMMERCIAL

## 2020-09-21 ENCOUNTER — TELEPHONE (OUTPATIENT)
Dept: FAMILY MEDICINE | Facility: CLINIC | Age: 40
End: 2020-09-21

## 2020-09-21 VITALS
WEIGHT: 185 LBS | OXYGEN SATURATION: 98 % | RESPIRATION RATE: 16 BRPM | SYSTOLIC BLOOD PRESSURE: 130 MMHG | DIASTOLIC BLOOD PRESSURE: 60 MMHG | TEMPERATURE: 98.2 F | BODY MASS INDEX: 30.82 KG/M2 | HEIGHT: 65 IN | HEART RATE: 78 BPM

## 2020-09-21 DIAGNOSIS — R10.31 BILATERAL GROIN PAIN: ICD-10-CM

## 2020-09-21 DIAGNOSIS — R10.2 SUPRAPUBIC PAIN: Primary | ICD-10-CM

## 2020-09-21 DIAGNOSIS — E11.9 TYPE 2 DIABETES MELLITUS WITHOUT COMPLICATION, UNSPECIFIED WHETHER LONG TERM INSULIN USE (H): ICD-10-CM

## 2020-09-21 DIAGNOSIS — R10.32 BILATERAL GROIN PAIN: ICD-10-CM

## 2020-09-21 LAB
ALBUMIN SERPL-MCNC: 3.7 G/DL (ref 3.4–5)
ALP SERPL-CCNC: 87 U/L (ref 40–150)
ALT SERPL W P-5'-P-CCNC: 54 U/L (ref 0–70)
ANION GAP SERPL CALCULATED.3IONS-SCNC: 8 MMOL/L (ref 3–14)
AST SERPL W P-5'-P-CCNC: 26 U/L (ref 0–45)
BILIRUB SERPL-MCNC: 0.5 MG/DL (ref 0.2–1.3)
BUN SERPL-MCNC: 13 MG/DL (ref 7–30)
CALCIUM SERPL-MCNC: 8.8 MG/DL (ref 8.5–10.1)
CHLORIDE SERPL-SCNC: 101 MMOL/L (ref 94–109)
CO2 SERPL-SCNC: 25 MMOL/L (ref 20–32)
CREAT SERPL-MCNC: 0.92 MG/DL (ref 0.66–1.25)
GFR SERPL CREATININE-BSD FRML MDRD: >90 ML/MIN/{1.73_M2}
GLUCOSE SERPL-MCNC: 319 MG/DL (ref 70–99)
HBA1C MFR BLD: 7.3 % (ref 0–5.6)
POTASSIUM SERPL-SCNC: 4.5 MMOL/L (ref 3.4–5.3)
PROT SERPL-MCNC: 7.7 G/DL (ref 6.8–8.8)
SODIUM SERPL-SCNC: 134 MMOL/L (ref 133–144)

## 2020-09-21 PROCEDURE — 90686 IIV4 VACC NO PRSV 0.5 ML IM: CPT | Performed by: NURSE PRACTITIONER

## 2020-09-21 PROCEDURE — 83036 HEMOGLOBIN GLYCOSYLATED A1C: CPT | Performed by: NURSE PRACTITIONER

## 2020-09-21 PROCEDURE — 90472 IMMUNIZATION ADMIN EACH ADD: CPT | Performed by: NURSE PRACTITIONER

## 2020-09-21 PROCEDURE — 80053 COMPREHEN METABOLIC PANEL: CPT | Performed by: NURSE PRACTITIONER

## 2020-09-21 PROCEDURE — 36415 COLL VENOUS BLD VENIPUNCTURE: CPT | Performed by: NURSE PRACTITIONER

## 2020-09-21 PROCEDURE — 90732 PPSV23 VACC 2 YRS+ SUBQ/IM: CPT | Performed by: NURSE PRACTITIONER

## 2020-09-21 PROCEDURE — 99214 OFFICE O/P EST MOD 30 MIN: CPT | Mod: 25 | Performed by: NURSE PRACTITIONER

## 2020-09-21 PROCEDURE — 90471 IMMUNIZATION ADMIN: CPT | Performed by: NURSE PRACTITIONER

## 2020-09-21 ASSESSMENT — MIFFLIN-ST. JEOR: SCORE: 1681.03

## 2020-09-21 ASSESSMENT — PAIN SCALES - GENERAL: PAINLEVEL: MODERATE PAIN (4)

## 2020-09-21 NOTE — TELEPHONE ENCOUNTER
Central Prior Authorization Team   Phone: [phone]    PA Initiation    Medication: ACCU-CHEK GUIDE TEST STRIPS SEE DOCUMENTATION SECTION FOR MORE INFO  Ins[facility] - Phone [phone] Fax [phone]  Pharmacy Filling the Rx: [place], [place] - [address]  Filling Pharmacy Phone: [phone]  Filling Pharmacy Fax: [phone]  Start Date: 9/21/2020

## 2020-09-21 NOTE — TELEPHONE ENCOUNTER
Casi's insurance requires a prior authorization for testing more than 3.4 times a day on insulin  and he is testing 4 times a day. Please  contact his insurance. He has a [facility] PMAP PLAN and his ID# [id number]. Please let us know when this is approved . Thanks Peg Lockwood  Guinda Flo Technician   On behalf of [facility]  Pharmacy

## 2020-09-21 NOTE — PROGRESS NOTES
Subjective     Casi Villatoro is a 40 year old male who presents to clinic today for the following health issues:    HPI     Musculoskeletal problem/pain  Onset/Duration: 1 month   Description  Location:  lower middle abdominal/groin pain   Joint Swelling: YES-left to right lower abdomen   Redness: no  Pain: YES, is inside, no sensitivity to touch   Warmth: no  Intensity:  4/10  Progression of Symptoms:  Situational, in the monring when patient gets up and when patient plays soccer  Accompanying signs and symptoms:   Fevers: no  Numbness/tingling/weakness: no  History  Trauma to the area: no  Recent illness:  no  Previous similar problem: no  Previous evaluation:  no  Precipitating or alleviating factors:  Aggravating factors include: When playing soccer pain worsens   Therapies tried and outcome: heat and hot water outcome not effective     Diabetes Follow-up    How often are you checking your blood sugar? One time daily  What time of day are you checking your blood sugars (select all that apply)?  Before meals  Have you had any blood sugars above 200?  No  Have you had any blood sugars below 70?  No    What symptoms do you notice when your blood sugar is low?  None    What concerns do you have today about your diabetes? None and Other: needs more refills of supplies     Do you have any of these symptoms? (Select all that apply)  No numbness or tingling in feet.  No redness, sores or blisters on feet.  No complaints of excessive thirst.  No reports of blurry vision.  No significant changes to weight.    Have you had a diabetic eye exam in the last 12 months? No        BP Readings from Last 2 Encounters:   09/21/20 130/60   02/12/20 138/84     Hemoglobin A1C (%)   Date Value   09/21/2020 7.3 (H)   02/12/2020 7.5 (H)     LDL Cholesterol Calculated (mg/dL)   Date Value   06/27/2019     Cannot estimate LDL when triglyceride exceeds 400 mg/dL   05/02/2018     Cannot estimate LDL when triglyceride exceeds 400 mg/dL     LDL  "Cholesterol Direct (mg/dL)   Date Value   06/27/2019 53   05/02/2018 65           Review of Systems   Constitutional, HEENT, cardiovascular, pulmonary, gi and gu systems are negative, except as otherwise noted.      Objective    /60   Pulse 78   Temp 98.2  F (36.8  C) (Oral)   Resp 16   Ht 1.651 m (5' 5\")   Wt 83.9 kg (185 lb)   SpO2 98%   BMI 30.79 kg/m    Body mass index is 30.79 kg/m .  Physical Exam   GENERAL: healthy, alert and no distress  NECK: no adenopathy, no asymmetry, masses, or scars and thyroid normal to palpation  RESP: lungs clear to auscultation - no rales, rhonchi or wheezes  CV: regular rate and rhythm, normal S1 S2, no S3 or S4, no murmur, click or rub, no peripheral edema and peripheral pulses strong  ABDOMEN: tenderness suprapubic and bowel sounds normal  MS: tenderness to palpation inguinal area bilateral    Results for orders placed or performed in visit on 09/21/20 (from the past 24 hour(s))   Hemoglobin A1c   Result Value Ref Range    Hemoglobin A1C 7.3 (H) 0 - 5.6 %           Assessment & Plan   Problem List Items Addressed This Visit     Diabetes mellitus, type 2 (H)    Relevant Medications    blood glucose (NO BRAND SPECIFIED) test strip    insulin pen needle (32G X 4 MM) 32G X 4 MM miscellaneous    blood glucose monitoring (ACCU-CHEK MULTICLIX) lancets    Other Relevant Orders    Hemoglobin A1c (Completed)    Comprehensive metabolic panel (BMP + Alb, Alk Phos, ALT, AST, Total. Bili, TP) (Completed)      Other Visit Diagnoses     Suprapubic pain    -  Primary    Relevant Orders    US Hernia Evaluation    Bilateral groin pain        Relevant Orders    US Hernia Evaluation             BMI:   Estimated body mass index is 30.79 kg/m  as calculated from the following:    Height as of this encounter: 1.651 m (5' 5\").    Weight as of this encounter: 83.9 kg (185 lb).             See Patient Instructions  No follow-ups on file.    NIKKO Joiner [facility] " [facility]

## 2020-09-22 NOTE — TELEPHONE ENCOUNTER
Prior Authorization Approval    Authorization Effective Date: 6/22/2020  Authorization Expiration Date: 9/22/2021  Medication: ACCU-CHEK GUIDE TEST STRIPS-APPROVED  Approved Dose/Quantity:    Reference #:     Insurance Company: [employer] [place] - Phone [phone] Fax [phone]  Expected CoPay:       CoPay Card Available:      Foundation Assistance Needed:    Which Pharmacy is filling the prescription (Not needed for infusion/clinic administered): [facility] [place], [place] - [address]  Pharmacy Notified: Yes  Patient Notified: Yes  **Instructed pharmacy to notify patient when script is ready to /ship.**

## 2020-09-23 ENCOUNTER — HOSPITAL ENCOUNTER (OUTPATIENT)
Dept: ULTRASOUND IMAGING | Facility: CLINIC | Age: 40
Discharge: HOME | End: 2020-09-23
Attending: NURSE PRACTITIONER | Admitting: NURSE PRACTITIONER
Payer: COMMERCIAL

## 2020-09-23 ENCOUNTER — PATIENT OUTREACH (OUTPATIENT)
Dept: SURGERY | Facility: CLINIC | Age: 40
End: 2020-09-23

## 2020-09-23 DIAGNOSIS — R10.31 BILATERAL GROIN PAIN: ICD-10-CM

## 2020-09-23 DIAGNOSIS — R10.32 BILATERAL GROIN PAIN: ICD-10-CM

## 2020-09-23 DIAGNOSIS — K40.90 UNILATERAL INGUINAL HERNIA WITHOUT OBSTRUCTION OR GANGRENE, RECURRENCE NOT SPECIFIED: Primary | ICD-10-CM

## 2020-09-23 DIAGNOSIS — R10.2 SUPRAPUBIC PAIN: ICD-10-CM

## 2020-09-23 PROCEDURE — 76700 US EXAM ABDOM COMPLETE: CPT

## 2020-09-23 NOTE — PROGRESS NOTES
A referral was placed for this patient to consult with General Surgery regarding an inguinal hernia (US confirmed hernia).  Spoke with the patient with the assistance of an [place]  and patient declined to schedule a clinic visit as he states the area is no longer uncomfortable.

## 2020-10-13 NOTE — PROGRESS NOTES
3  SUBJECTIVE:   CC: Casi Villatoro is an 40 year old male who presents for preventive health visit.     Patient has been advised of split billing requirements and indicates understanding: No  Healthy Habits:    Do you get at least three servings of calcium containing foods daily (dairy, green leafy vegetables, etc.)? No    Amount of exercise or daily activities, outside of work: 1 hour(s) per day    Problems taking medications regularly No    Medication side effects: No    Have you had an eye exam in the past two years? yes    Do you see a dentist twice per year? no    Do you have sleep apnea, excessive snoring or daytime drowsiness?no      Diabetes Follow-up    How often are you checking your blood sugar? Two times daily  Blood sugar testing frequency justification:  Patient modifying lifestyle changes (diet, exercise) with blood sugars  What time of day are you checking your blood sugars (select all that apply)?  Before and after meals  Have you had any blood sugars above 200?  No  Have you had any blood sugars below 70?  No    What symptoms do you notice when your blood sugar is low?  Has not happened    What concerns do you have today about your diabetes? None     Do you have any of these symptoms? (Select all that apply)  No numbness or tingling in feet.  No redness, sores or blisters on feet.  No complaints of excessive thirst.  No reports of blurry vision.  No significant changes to weight.    Have you had a diabetic eye exam in the last 12 months? No        BP Readings from Last 2 Encounters:   10/14/20 124/80   09/21/20 130/60     Hemoglobin A1C (%)   Date Value   09/21/2020 7.3 (H)   02/12/2020 7.5 (H)     LDL Cholesterol Calculated (mg/dL)   Date Value   10/14/2020 52   06/27/2019     Cannot estimate LDL when triglyceride exceeds 400 mg/dL     LDL Cholesterol Direct (mg/dL)   Date Value   06/27/2019 53       Today's PHQ-2 Score:   PHQ-2 ( 1999 [employer]) 2/12/2020 9/26/2018   Q1: Little interest or pleasure  "in doing things 0 0   Q2: Feeling down, depressed or hopeless 0 0   PHQ-2 Score 0 0       Abuse: Current or Past(Physical, Sexual or Emotional)- No  Do you feel safe in your environment? Yes        Social History     Tobacco Use     Smoking status: Never Smoker     Smokeless tobacco: Never Used   Substance Use Topics     Alcohol use: No     If you drink alcohol do you typically have >3 drinks per day or >7 drinks per week? No                      Last PSA: No results found for: PSA    Reviewed orders with patient. Reviewed health maintenance and updated orders accordingly - Yes  Lab work is in process    Reviewed and updated as needed this visit by clinical staff  Tobacco  Allergies  Meds              Reviewed and updated as needed this visit by Provider                    ROS:  CONSTITUTIONAL: NEGATIVE for fever, chills, change in weight  INTEGUMENTARY/SKIN: NEGATIVE for worrisome rashes, moles or lesions  EYES: NEGATIVE for vision changes or irritation  ENT: NEGATIVE for ear, mouth and throat problems  RESP: NEGATIVE for significant cough or SOB  CV: NEGATIVE for chest pain, palpitations or peripheral edema  GI: NEGATIVE for nausea, abdominal pain, heartburn, or change in bowel habits   male: negative for dysuria, hematuria, decreased urinary stream, erectile dysfunction, urethral discharge  MUSCULOSKELETAL: NEGATIVE for significant arthralgias or myalgia  NEURO: NEGATIVE for weakness, dizziness or paresthesias  PSYCHIATRIC: NEGATIVE for changes in mood or affect    OBJECTIVE:   /80   Pulse 60   Temp 97.9  F (36.6  C) (Oral)   Ht 1.651 m (5' 5\")   Wt 81.6 kg (180 lb)   SpO2 100%   BMI 29.95 kg/m    EXAM:  GENERAL: healthy, alert and no distress  EYES: Eyes grossly normal to inspection, PERRL and conjunctivae and sclerae normal  HENT: ear canals and TM's normal, nose and mouth without ulcers or lesions  NECK: no adenopathy, no asymmetry, masses, or scars and thyroid normal to palpation  RESP: lungs " "clear to auscultation - no rales, rhonchi or wheezes  CV: regular rate and rhythm, normal S1 S2, no S3 or S4, no murmur, click or rub, no peripheral edema and peripheral pulses strong  ABDOMEN: soft, nontender, no hepatosplenomegaly, no masses and bowel sounds normal  RECTAL: deferred  MS: no gross musculoskeletal defects noted, no edema  SKIN: no suspicious lesions or rashes  NEURO: Normal strength and tone, mentation intact and speech normal  PSYCH: mentation appears normal, affect normal/bright    Diagnostic Test Results:  Labs reviewed in Epic    ASSESSMENT/PLAN:   1. Routine general medical examination at a health care facility  Doing well    2. Diabetes mellitus type 2, insulin dependent (H)  Not at goal but close  - Lipid panel reflex to direct LDL Fasting  - Albumin Random Urine Quantitative with Creat Ratio; Future    Patient has been advised of split billing requirements and indicates understanding:   COUNSELING:  Reviewed preventive health counseling, as reflected in patient instructions       Regular exercise       Healthy diet/nutrition       Vision screening    Estimated body mass index is 29.95 kg/m  as calculated from the following:    Height as of this encounter: 1.651 m (5' 5\").    Weight as of this encounter: 81.6 kg (180 lb).    Weight management plan: Discussed healthy diet and exercise guidelines    He reports that he has never smoked. He has never used smokeless tobacco.      Counseling Resources:  ATP IV Guidelines  Pooled Cohorts Equation Calculator  FRAX Risk Assessment  ICSI Preventive Guidelines  Dietary Guidelines for Americans, 2010  USDA's MyPlate  ASA Prophylaxis  Lung CA Screening    Rafa Dhillon MD  [facility]  "

## 2020-10-14 ENCOUNTER — OFFICE VISIT (OUTPATIENT)
Dept: FAMILY MEDICINE | Facility: CLINIC | Age: 40
End: 2020-10-14
Payer: COMMERCIAL

## 2020-10-14 VITALS
DIASTOLIC BLOOD PRESSURE: 80 MMHG | SYSTOLIC BLOOD PRESSURE: 124 MMHG | HEIGHT: 65 IN | HEART RATE: 60 BPM | WEIGHT: 180 LBS | BODY MASS INDEX: 29.99 KG/M2 | TEMPERATURE: 97.9 F | OXYGEN SATURATION: 100 %

## 2020-10-14 DIAGNOSIS — E11.9 DIABETES MELLITUS TYPE 2, INSULIN DEPENDENT (H): ICD-10-CM

## 2020-10-14 DIAGNOSIS — Z79.4 DIABETES MELLITUS TYPE 2, INSULIN DEPENDENT (H): ICD-10-CM

## 2020-10-14 DIAGNOSIS — Z00.00 ROUTINE GENERAL MEDICAL EXAMINATION AT A HEALTH CARE FACILITY: Primary | ICD-10-CM

## 2020-10-14 LAB
CHOLEST SERPL-MCNC: 162 MG/DL
HDLC SERPL-MCNC: 31 MG/DL
LDLC SERPL CALC-MCNC: 52 MG/DL
NONHDLC SERPL-MCNC: 131 MG/DL
TRIGL SERPL-MCNC: 394 MG/DL

## 2020-10-14 PROCEDURE — 80061 LIPID PANEL: CPT | Performed by: FAMILY MEDICINE

## 2020-10-14 PROCEDURE — 36415 COLL VENOUS BLD VENIPUNCTURE: CPT | Performed by: FAMILY MEDICINE

## 2020-10-14 PROCEDURE — 99395 PREV VISIT EST AGE 18-39: CPT | Performed by: FAMILY MEDICINE

## 2020-10-14 ASSESSMENT — MIFFLIN-ST. JEOR: SCORE: 1658.35

## 2020-10-15 NOTE — RESULT ENCOUNTER NOTE
Please notify patient: Triglycerides continue to improve.  Continue to cut simple carbohydrates such as bread, rice, pasta, potatoes, exercise regularly, and recheck in 1 year.  Contact if questions, nice to see you!    Thanks Rafa Dhillon MD

## 2021-01-13 ENCOUNTER — OFFICE VISIT (OUTPATIENT)
Dept: FAMILY MEDICINE | Facility: CLINIC | Age: 41
End: 2021-01-13
Payer: COMMERCIAL

## 2021-01-13 VITALS
HEART RATE: 72 BPM | SYSTOLIC BLOOD PRESSURE: 128 MMHG | RESPIRATION RATE: 16 BRPM | OXYGEN SATURATION: 97 % | TEMPERATURE: 98.1 F | DIASTOLIC BLOOD PRESSURE: 86 MMHG

## 2021-01-13 DIAGNOSIS — Z79.4 TYPE 2 DIABETES MELLITUS WITHOUT COMPLICATION, WITH LONG-TERM CURRENT USE OF INSULIN (H): ICD-10-CM

## 2021-01-13 DIAGNOSIS — E11.9 TYPE 2 DIABETES MELLITUS WITHOUT COMPLICATION, WITH LONG-TERM CURRENT USE OF INSULIN (H): ICD-10-CM

## 2021-01-13 DIAGNOSIS — R07.0 PAIN IN THROAT: Primary | ICD-10-CM

## 2021-01-13 PROBLEM — R73.9 HYPERGLYCEMIA: Status: RESOLVED | Noted: 2019-06-26 | Resolved: 2021-01-13

## 2021-01-13 LAB
DEPRECATED S PYO AG THROAT QL EIA: NEGATIVE
SPECIMEN SOURCE: NORMAL

## 2021-01-13 PROCEDURE — 99N1174 PR STATISTIC STREP A RAPID: Performed by: FAMILY MEDICINE

## 2021-01-13 PROCEDURE — 99213 OFFICE O/P EST LOW 20 MIN: CPT | Performed by: FAMILY MEDICINE

## 2021-01-13 PROCEDURE — 87651 STREP A DNA AMP PROBE: CPT | Performed by: FAMILY MEDICINE

## 2021-01-13 RX ORDER — FAMOTIDINE 20 MG/1
20 TABLET, FILM COATED ORAL 2 TIMES DAILY
Qty: 28 TABLET | Refills: 1 | Status: SHIPPED | OUTPATIENT
Start: 2021-01-13 | End: 2022-05-26

## 2021-01-13 NOTE — PATIENT INSTRUCTIONS
Take famotidine 20 mg twice daily x 2 weeks, then as needed for sore throat. Let us know if it doesn't work.     If throat worse and can't breath- go to ER    Let us know, can set up a referral

## 2021-01-13 NOTE — PROGRESS NOTES
Assessment & Plan     Pain in throat  Viral v strep v GERD v laryngospasm  - Streptococcus A Rapid Scr w Reflx to PCR  - Group A Streptococcus PCR Throat Swab  - famotidine (PEPCID) 20 MG tablet; Take 1 tablet (20 mg) by mouth 2 times daily    Type 2 diabetes mellitus without complication, with long-term current use of insulin (H)  Almost at goal 4 months ago    Review of the result(s) of each unique test - prior A1c goal < 7.0  20 minutes spent on the date of the encounter doing chart review, history and exam, documentation and further activities as noted above     Patient Instructions   Take famotidine 20 mg twice daily x 2 weeks, then as needed for sore throat. Let us know if it doesn't work.     If throat worse and can't breath- go to ER    Let us know, can set up a referral        Return in 2 months (on 3/13/2021), or if symptoms worsen or fail to improve, for Lab Work.    Rafa Dhillon MD  [facility] is a 40 year old who presents to clinic today for the following health issues    HPI       Acute Illness  Acute illness concerns: Sore Throat  Onset/Duration: on going for a while per patient .  Symptoms:  Fever: no  Chills/Sweats: no  Headache (location?): no  Sinus Pressure: no  Conjunctivitis:  no  Ear Pain: no  Rhinorrhea: no  Congestion: YES- when drinking   Sore Throat: YES  Cough: no  Wheeze: no  Decreased Appetite: no  Nausea: no  Vomiting: no  Diarrhea: no  Dysuria/Freq.: no  Dysuria or Hematuria: no  Fatigue/Achiness: no  Sick/Strep Exposure: no  Therapies tried and outcome: None- mentioned he has took something in the past but cannot recall.    Diabetes Follow-up    How often are you checking your blood sugar? Two times daily  Blood sugar testing frequency justification:  Patient modifying lifestyle changes (diet, exercise) with blood sugars  What time of day are you checking your blood sugars (select all that apply)?  Before and after meals  Have  you had any blood sugars above 200?  No  Have you had any blood sugars below 70?  No    What symptoms do you notice when your blood sugar is low?  None    What concerns do you have today about your diabetes? None     Do you have any of these symptoms? (Select all that apply)  No numbness or tingling in feet.  No redness, sores or blisters on feet.  No complaints of excessive thirst.  No reports of blurry vision.  No significant changes to weight.    Have you had a diabetic eye exam in the last 12 months?         BP Readings from Last 2 Encounters:   01/13/21 128/86   10/14/20 124/80     Hemoglobin A1C (%)   Date Value   09/21/2020 7.3 (H)   02/12/2020 7.5 (H)     LDL Cholesterol Calculated (mg/dL)   Date Value   10/14/2020 52   06/27/2019     Cannot estimate LDL when triglyceride exceeds 400 mg/dL     LDL Cholesterol Direct (mg/dL)   Date Value   06/27/2019 53       How many days per week do you miss taking your medication? 0      Review of Systems   Constitutional, HEENT, cardiovascular, pulmonary, gi and gu systems are negative, except as otherwise noted.      Objective    /86   Pulse 72   Temp 98.1  F (36.7  C) (Temporal)   Resp 16   SpO2 97%   There is no height or weight on file to calculate BMI.  Physical Exam   GENERAL: alert and mild distress  NECK: no adenopathy, no asymmetry, masses, or scars and thyroid normal to palpation  RESP: lungs clear to auscultation - no rales, rhonchi or wheezes  CV: regular rate and rhythm, normal S1 S2, no S3 or S4, no murmur, click or rub, no peripheral edema and peripheral pulses strong  SKIN: no suspicious lesions or rashes  PSYCH: mentation appears normal and anxious

## 2021-01-14 LAB
SPECIMEN SOURCE: NORMAL
STREP GROUP A PCR: NOT DETECTED

## 2021-01-22 NOTE — RESULT ENCOUNTER NOTE
Jonathon Lance, your recent results are back and are all normal. Please contact if any questions.   Rafa Dhillon MD

## 2021-02-24 ENCOUNTER — OFFICE VISIT (OUTPATIENT)
Dept: FAMILY MEDICINE | Facility: CLINIC | Age: 41
End: 2021-02-24
Payer: COMMERCIAL

## 2021-02-24 ENCOUNTER — APPOINTMENT (OUTPATIENT)
Dept: INTERPRETER SERVICES | Facility: CLINIC | Age: 41
End: 2021-02-24
Payer: COMMERCIAL

## 2021-02-24 VITALS
BODY MASS INDEX: 30.45 KG/M2 | WEIGHT: 183 LBS | DIASTOLIC BLOOD PRESSURE: 90 MMHG | TEMPERATURE: 97.5 F | SYSTOLIC BLOOD PRESSURE: 110 MMHG | HEART RATE: 77 BPM | OXYGEN SATURATION: 98 %

## 2021-02-24 DIAGNOSIS — E04.9 GOITER: Primary | ICD-10-CM

## 2021-02-24 DIAGNOSIS — E11.9 TYPE 2 DIABETES MELLITUS WITHOUT COMPLICATION, UNSPECIFIED WHETHER LONG TERM INSULIN USE (H): ICD-10-CM

## 2021-02-24 PROCEDURE — 99214 OFFICE O/P EST MOD 30 MIN: CPT | Performed by: FAMILY MEDICINE

## 2021-02-24 NOTE — PROGRESS NOTES
Assessment & Plan     Goiter  Rule out extrinsic pressure on the trachea/esophagous.  - US Thyroid; Future  - TSH with free T4 reflex; Future  - Thyroid peroxidase antibody; Future    Type 2 diabetes mellitus without complication, unspecified whether long term insulin use (H)  Not at goal but improving.   - blood glucose monitoring (ACCU-CHEK MULTICLIX) lancets; Use to test blood sugar 4 times daily or as directed.  - blood glucose (NO BRAND SPECIFIED) test strip; Use to test blood sugar 4 times daily or as directed.    Review of external notes as documented elsewhere in note  25 minutes spent on the date of the encounter doing chart review, history and exam, documentation and further activities as noted above     Patient Instructions   Call [phone] to schedule US thyroid.       No follow-ups on file.    Rafa Dhillon MD  [facility]    Addie Lance is a 41 year old who presents for the following health issues    HPI     Concern - Throat Pain  Onset: 2 years  Description: Patient describes throat pain as on and off for two years but this time feels pain is different.   Intensity: moderate  Progression of Symptoms:  worsening  Accompanying Signs & Symptoms: throat pain  Previous history of similar problem: none  Precipitating factors:        Worsened by: with sudden head movement   Alleviating factors:        Improved by: none  Therapies tried and outcome: OTC medication    Diabetes Follow-up    How often are you checking your blood sugar? Two times daily  Blood sugar testing frequency justification:  Patient modifying lifestyle changes (diet, exercise) with blood sugars  What time of day are you checking your blood sugars (select all that apply)?  Before and after meals  Have you had any blood sugars above 200?  No  Have you had any blood sugars below 70?  No    What symptoms do you notice when your blood sugar is low?  None    What concerns do you have today about your  diabetes? None     Do you have any of these symptoms? (Select all that apply)  No numbness or tingling in feet.  No redness, sores or blisters on feet.  No complaints of excessive thirst.  No reports of blurry vision.  No significant changes to weight.    Have you had a diabetic eye exam in the last 12 months?         BP Readings from Last 2 Encounters:   02/24/21 (!) 110/90   01/13/21 128/86     Hemoglobin A1C (%)   Date Value   09/21/2020 7.3 (H)   02/12/2020 7.5 (H)     LDL Cholesterol Calculated (mg/dL)   Date Value   10/14/2020 52   06/27/2019     Cannot estimate LDL when triglyceride exceeds 400 mg/dL     LDL Cholesterol Direct (mg/dL)   Date Value   06/27/2019 53     Review of Systems   Constitutional, HEENT, cardiovascular, pulmonary, gi and gu systems are negative, except as otherwise noted.      Objective    BP (!) 110/90   Pulse 77   Temp 97.5  F (36.4  C) (Temporal)   Wt 83 kg (183 lb)   SpO2 98%   BMI 30.45 kg/m    Body mass index is 30.45 kg/m .  Physical Exam   GENERAL: healthy, alert and no distress  NECK: no adenopathy, thyromegaly approximately 1.5 times normal, thyroid nodule none, trachea midline and normal to palpation and no carotid bruits  RESP: lungs clear to auscultation - no rales, rhonchi or wheezes  CV: regular rate and rhythm, normal S1 S2, no S3 or S4, no murmur, click or rub, no peripheral edema and peripheral pulses strong  ABDOMEN: soft, nontender, no hepatosplenomegaly, no masses and bowel sounds normal  MS: no gross musculoskeletal defects noted, no edema

## 2021-02-25 ENCOUNTER — HOSPITAL ENCOUNTER (OUTPATIENT)
Dept: ULTRASOUND IMAGING | Facility: CLINIC | Age: 41
Discharge: HOME | End: 2021-02-25
Attending: FAMILY MEDICINE | Admitting: FAMILY MEDICINE
Payer: COMMERCIAL

## 2021-02-25 DIAGNOSIS — E04.9 GOITER: ICD-10-CM

## 2021-02-25 PROCEDURE — 76536 US EXAM OF HEAD AND NECK: CPT

## 2021-02-25 PROCEDURE — 76536 US EXAM OF HEAD AND NECK: CPT | Mod: 26 | Performed by: RADIOLOGY

## 2021-02-26 ENCOUNTER — APPOINTMENT (OUTPATIENT)
Dept: INTERPRETER SERVICES | Facility: CLINIC | Age: 41
End: 2021-02-26
Payer: COMMERCIAL

## 2021-02-26 NOTE — RESULT ENCOUNTER NOTE
Normal results, please notify patient. If still having symptoms, let me know. Thanks Rafa Dhillon MD

## 2021-04-19 ENCOUNTER — OFFICE VISIT (OUTPATIENT)
Dept: FAMILY MEDICINE | Facility: CLINIC | Age: 41
End: 2021-04-19
Payer: COMMERCIAL

## 2021-04-19 VITALS
BODY MASS INDEX: 30.16 KG/M2 | HEART RATE: 80 BPM | WEIGHT: 181 LBS | OXYGEN SATURATION: 97 % | HEIGHT: 65 IN | DIASTOLIC BLOOD PRESSURE: 82 MMHG | SYSTOLIC BLOOD PRESSURE: 118 MMHG | TEMPERATURE: 97.9 F

## 2021-04-19 DIAGNOSIS — R13.13 PHARYNGEAL DYSPHAGIA: Primary | ICD-10-CM

## 2021-04-19 DIAGNOSIS — E11.9 TYPE 2 DIABETES MELLITUS WITHOUT COMPLICATION, UNSPECIFIED WHETHER LONG TERM INSULIN USE (H): ICD-10-CM

## 2021-04-19 LAB
CREAT UR-MCNC: 188 MG/DL
HBA1C MFR BLD: 7.7 % (ref 0–5.6)
MICROALBUMIN UR-MCNC: 57 MG/L
MICROALBUMIN/CREAT UR: 30.32 MG/G CR (ref 0–17)

## 2021-04-19 PROCEDURE — 36415 COLL VENOUS BLD VENIPUNCTURE: CPT | Performed by: FAMILY MEDICINE

## 2021-04-19 PROCEDURE — 82043 UR ALBUMIN QUANTITATIVE: CPT | Performed by: FAMILY MEDICINE

## 2021-04-19 PROCEDURE — 83036 HEMOGLOBIN GLYCOSYLATED A1C: CPT | Performed by: FAMILY MEDICINE

## 2021-04-19 PROCEDURE — 99214 OFFICE O/P EST MOD 30 MIN: CPT | Performed by: FAMILY MEDICINE

## 2021-04-19 ASSESSMENT — MIFFLIN-ST. JEOR: SCORE: 1657.89

## 2021-04-19 NOTE — PATIENT INSTRUCTIONS
Call to schedule ENT appointment for swallowing problem  Will contact with lab results  Followup in 4 months

## 2021-04-19 NOTE — PROGRESS NOTES
"Assessment & Plan     Pharyngeal dysphagia  - OTOLARYNGOLOGY REFERRAL    Type 2 diabetes mellitus without complication, unspecified whether long term insulin use (H)  Not at goal   - Hemoglobin A1c; Future  - Albumin Random Urine Quantitative with Creat Ratio; Future    Review of external notes as documented elsewhere in note  Ordering of each unique test  30 minutes spent on the date of the encounter doing chart review, history and exam, documentation and further activities per the note     BMI:   Estimated body mass index is 30.12 kg/m  as calculated from the following:    Height as of this encounter: 1.651 m (5' 5\").    Weight as of this encounter: 82.1 kg (181 lb).   Weight management plan: Discussed healthy diet and exercise guidelines    Patient Instructions   Call to schedule ENT appointment for swallowing problem  Will contact with lab results  Followup in 4 months      Return in about 4 months (around 8/19/2021).    Rafa Dhillon MD  [facility] is a 41 year [facility] who presents for the following health issues  accompanied by his  by phone:    HPI     Neck Pain/Front Of Neck  Onset/Duration: 2 years  Description:   Location: Front of throat  Radiation: none  Intensity: severe  Progression of Symptoms:  same  Accompanying Signs & Symptoms:  Burning, tingling, prickly sensation in arm(s): no  Numbness in arm(s): no  Weakness in arm(s):  no  Fever: no  Headache: no  Nausea and/or vomiting: no  History:   Trauma: no  Previous neck pain: no  Previous surgery or injections: no  Previous Imaging (MRI,X ray): YES  Precipitating or alleviating factors: None  Does movement impact the pain:  YES  Therapies tried and outcome: nothing    Diabetes Follow-up    How often are you checking your blood sugar? One time daily  What time of day are you checking your blood sugars (select all that apply)?  Before meals  Have you had any blood sugars above 200?  No  Have " "you had any blood sugars below 70?  No    What symptoms do you notice when your blood sugar is low?  None    What concerns do you have today about your diabetes? None     Do you have any of these symptoms? (Select all that apply)  No numbness or tingling in feet.  No redness, sores or blisters on feet.  No complaints of excessive thirst.  No reports of blurry vision.  No significant changes to weight.    Have you had a diabetic eye exam in the last 12 months?         BP Readings from Last 2 Encounters:   04/19/21 118/82   02/24/21 (!) 110/90     Hemoglobin A1C (%)   Date Value   04/19/2021 7.7 (H)   09/21/2020 7.3 (H)     LDL Cholesterol Calculated (mg/dL)   Date Value   10/14/2020 52   06/27/2019     Cannot estimate LDL when triglyceride exceeds 400 mg/dL     LDL Cholesterol Direct (mg/dL)   Date Value   06/27/2019 53       How many days per week do you miss taking your medication? 0      Review of Systems   Constitutional, HEENT, cardiovascular, pulmonary, gi and gu systems are negative, except as otherwise noted.      Objective    /82   Pulse 80   Temp 97.9  F (36.6  C) (Temporal)   Ht 1.651 m (5' 5\")   Wt 82.1 kg (181 lb)   SpO2 97%   BMI 30.12 kg/m    Body mass index is 30.12 kg/m .  Physical Exam   GENERAL: healthy, alert and no distress  HENT: nose and mouth without ulcers or lesions, oropharynx clear, oral mucous membranes moist, oropharxnx not crowded and no tonsillar hypertrophy  NECK: no adenopathy, no asymmetry, masses, or scars and thyroid normal to palpation  RESP: lungs clear to auscultation - no rales, rhonchi or wheezes  CV: regular rate and rhythm, normal S1 S2, no S3 or S4, no murmur, click or rub, no peripheral edema and peripheral pulses strong  ABDOMEN: soft, nontender, no hepatosplenomegaly, no masses and bowel sounds normal  SKIN: no suspicious lesions or rashes  NEURO: Normal strength and tone, mentation intact and speech normal  PSYCH: mentation appears normal, affect " normal/bright

## 2021-04-22 NOTE — TELEPHONE ENCOUNTER
FUTURE VISIT INFORMATION      FUTURE VISIT INFORMATION:    Date: 6/15/21    Time: 8 AM    [place] [facility]  REFERRAL INFORMATION:    Referring provider:  Dr. Rafa Dhillon    Referring providers clinic:  [facility]    Reason for visit/diagnosis: Pharyngeal Dysphagia    RECORDS REQUESTED FROM:       Clinic name Comments Records Status Imaging Status   [facility] 4/19/21, 1/13/21 - PCC OV with Dr. Dhillon  4/17/18 - [facility] OV with Twila Stewart NP [facility] 5/26/19 - ED OV with Dr. Joyner [facility]    [facility] - Imaging 2/25/21 - US Thyroid  8/6/15 - XR Chest Epic PACs

## 2021-04-25 PROBLEM — E11.29 TYPE 2 DIABETES MELLITUS WITH MICROALBUMINURIA, WITH LONG-TERM CURRENT USE OF INSULIN (H): Status: ACTIVE | Noted: 2020-02-12

## 2021-04-25 PROBLEM — Z79.4 TYPE 2 DIABETES MELLITUS WITH MICROALBUMINURIA, WITH LONG-TERM CURRENT USE OF INSULIN (H): Status: ACTIVE | Noted: 2020-02-12

## 2021-04-25 PROBLEM — R80.9 TYPE 2 DIABETES MELLITUS WITH MICROALBUMINURIA, WITH LONG-TERM CURRENT USE OF INSULIN (H): Status: ACTIVE | Noted: 2020-02-12

## 2021-04-25 NOTE — RESULT ENCOUNTER NOTE
Please notify patient: send letter, and copy of labs. Diabetic control is slightly worse- still under A1c 8.0 (best is under 7.0) Also, the diabetes has caused a small increase in the amount of protein coming out of the kidneys into the urine. Getting the diabetes better (A1c under 7.0) and keeping the blood pressure less than 130/80 can help.   Recommend increasing Lantus from 40 units daily up to 42 units daily. And make a clinic appointment to see me in 3 months (July)    Thanks Rafa Dhillon MD

## 2021-04-26 ENCOUNTER — APPOINTMENT (OUTPATIENT)
Dept: INTERPRETER SERVICES | Facility: CLINIC | Age: 41
End: 2021-04-26
Payer: COMMERCIAL

## 2021-04-26 DIAGNOSIS — E11.9 TYPE 2 DIABETES MELLITUS WITHOUT COMPLICATION, UNSPECIFIED WHETHER LONG TERM INSULIN USE (H): ICD-10-CM

## 2021-06-15 ENCOUNTER — VIRTUAL VISIT (OUTPATIENT)
Dept: OTOLARYNGOLOGY | Facility: CLINIC | Age: 41
End: 2021-06-15
Attending: FAMILY MEDICINE
Payer: COMMERCIAL

## 2021-06-15 ENCOUNTER — PRE VISIT (OUTPATIENT)
Dept: OTOLARYNGOLOGY | Facility: CLINIC | Age: 41
End: 2021-06-15

## 2021-06-15 VITALS — HEIGHT: 65 IN | BODY MASS INDEX: 30.16 KG/M2 | WEIGHT: 181 LBS

## 2021-06-15 DIAGNOSIS — R09.89 CHRONIC THROAT CLEARING: Primary | ICD-10-CM

## 2021-06-15 PROCEDURE — 99203 OFFICE O/P NEW LOW 30 MIN: CPT | Mod: 95 | Performed by: OTOLARYNGOLOGY

## 2021-06-15 ASSESSMENT — MIFFLIN-ST. JEOR: SCORE: 1657.89

## 2021-06-15 ASSESSMENT — PAIN SCALES - GENERAL: PAINLEVEL: NO PAIN (0)

## 2021-06-15 NOTE — PROGRESS NOTES
Casi is a 41 year old who is being evaluated via a billable video visit.       How would you like to obtain your AVS? Mail a copy  If the video visit is dropped, the invitation should be resent by: Text to cell phone: [phone]  Will anyone else be joining your video visit? No      Video Start Time: 8:03  Video-Visit Details    Type of service:  Video Visit    Video End Time:8:42    Originating Location (pt. Location): Home    Distant Location (provider location):  [facility] NOSE [facility] [place]     Platform used for Video Visit: [facility]   at the [facility]   Otolaryngology Clinic     Patient: Casi Villatoro    MRN: [id number]    : 1980    Age/Gender: 41 year old male  Date of Service: 6/15/2021  Rendering Provider:   Roberta Roman MD     Referring Provider   PCP: Rafa Dhillon  Referring Physician: Rafa Dhillon MD  [address]  [place] [address]  Reason for Consultation   Chronic throat clearing  History   HISTORY OF PRESENT ILLNESS: I was asked to consult on Casi Villatoro, by Dr Rafa Dhillon for evaluation of dysphagia . Mr. Villatoro is a 41 year old male who presents to us today with chronic throat clearing      Today, he, presents for evaluation with an . History is difficult to obtain. He reports:  - Voice  My throat is dry   He feels something heavy around the throat   I m not getting air - difficulty of breathing   - He does not get air and has to make a throat clearing sound   8 years  During COVID times it got worse  He was going to [facility] - and was told it is gastric - and he got some medication and then it came back to him  - The medication doesn t help him  - He doesn t feel it helps him    Dysphonia  - He has to throat clear sometimes because of something in his throat  - During cold and when he has trouble breathing his voice is not good  - Denies pain  - Denies strain  - Denies  issues with singing    Dysphagia  - Denies    Dyspnea  - He drinks water which helps his breathing  -  it happens because the throat is dry   -  it happens when sleeping, sitting or standing   -  especially when I am sleeping   - Sometimes it wakes me up from my sleep  - Something is heavy on my throat  - He feels difficulty getting air out  - He feels the dryness of the throat and the feeling of throat clearing - everyday   - Waking him from his sleep it happens 3-4 months  - Rarely - months   - Beginning of April was the last time this happened   - The difficulty breathing comes from the dryness of the throat     Throat clearing/coughing  - He feels something in his throat that s why he does this   - For 8 years  - he doesn t remember how it started  - never had any throat procedures  - cold water makes it worse  - happens at rest  - sometimes worse with talking  - he plays soccer - doesn t throat clear during this  - during the winter     GERD/LPRD  - gastric issue 3 years ago  - he was prescribed medication  - it was in [place]  - didn t have an endoscopy  - in the AM - denies AM LPRD  -   Nasal congestion   - Denies    BEST  - Denies   Never had scope exam     PAST MEDICAL HISTORY:   Past Medical History:   Diagnosis Date     BMI 31.0-31.9,adult 6/20/2013     Hyperlipidemia LDL goal <100 4/16/2013       PAST SURGICAL HISTORY:   Past Surgical History:   Procedure Laterality Date     NO HISTORY OF SURGERY  2013       CURRENT MEDICATIONS:   Current Outpatient Medications:      blood glucose (NO BRAND SPECIFIED) test strip, Use to test blood sugar 4 times daily or as directed., Disp: 200 strip, Rfl: 3     blood glucose monitoring (ACCU-CHEK MULTICLIX) lancets, Use to test blood sugar 4 times daily or as directed., Disp: 200 each, Rfl: 3     famotidine (PEPCID) 20 MG tablet, Take 1 tablet (20 mg) by mouth 2 times daily, Disp: 28 tablet, Rfl: 1     insulin aspart (NOVOLOG PEN) 100 UNIT/ML pen, 5-10 units per meal (5  units small meal.  10 units large meal), Disp: 15 mL, Rfl: 0     insulin glargine (LANTUS SOLOSTAR) 100 UNIT/ML pen, INJECT 42 UNITS UNDER THE SKIN ONCE DAILY, Disp: 15 mL, Rfl: 1     insulin pen needle (32G X 4 MM) 32G X 4 MM miscellaneous, Use 4 pen needles daily or as directed., Disp: 100 each, Rfl: 3    ALLERGIES: Patient has no known allergies.    SOCIAL HISTORY:    Social History     Socioeconomic History     Marital status:      Spouse name: Hillary Diehl     Number of children: 0     Years of education: Not on file     Highest education level: Not on file   Occupational History     Employer: UNEMPLOYED   Social Needs     Financial resource strain: Not on file     Food insecurity     Worry: Not on file     Inability: Not on file     Transportation needs     Medical: Not on file     Non-medical: Not on file   Tobacco Use     Smoking status: Never Smoker     Smokeless tobacco: Never Used   Substance and Sexual Activity     Alcohol use: No     Drug use: No     Sexual activity: Yes     Partners: Female   Lifestyle     Physical activity     Days per week: Not on file     Minutes per session: Not on file     Stress: Not on file   Relationships     Social connections     Talks on phone: Not on file     Gets together: Not on file     Attends Judaism service: Not on file     Active member of club or organization: Not on file     Attends meetings of clubs or organizations: Not on file     Relationship status: Not on file     Intimate partner violence     Fear of current or ex partner: Not on file     Emotionally abused: Not on file     Physically abused: Not on file     Forced sexual activity: Not on file   Other Topics Concern     Parent/sibling w/ CABG, MI or angioplasty before 65F 55M? Not Asked   Social History Narrative     Not on file         FAMILY HISTORY:   Family History   Problem Relation Age of Onset     Cancer - colorectal No family hx of      Prostate Cancer No family hx of      Non-contributory for  problems with anesthesia    REVIEW OF SYSTEMS:   The patient was asked a 14 point review of systems regarding constitutional symptoms, eye symptoms, ears, nose, mouth, throat symptoms, cardiovascular symptoms, respiratory symptoms, gastrointestinal symptoms, genitourinary symptoms, musculoskeletal symptoms, integumentary symptoms, neurological symptoms, psychiatric symptoms, endocrine symptoms, hematologic/lymphatic symptoms, and allergic/ immunologic symptoms.   The pertinent factors have been included in the HPI and below.  Patient Supplied Answers to Review of Systems  No flowsheet data found.    Physical Examination   The patient underwent a physical examination as described below. The pertinent positive and negative findings are summarized after the description of the examination.    Constitutional: The patient's developmental and nutritional status was assessed. The patient's voice quality was assessed.  Head and Face: The head and face were inspected for deformities. Facial muscle strength was assessed bilaterally.  Eyes: Extraocular movements and primary gaze alignment were assessed.  Ears, Nose, Mouth and Throat: The ears and nose were examined for deformities.The lips were examined for abnormalities.   Neck: The neck was visualized for abnormal neck masses  Respiratory: The nature of the breathing was observed.  Extremities: The hand extremities were examined for any clubbing or cyanosis.  Skin: The skin was examined for inflammatory or neoplastic conditions.  Neurologic: The patient's orientation, mood, and affect were noted. The cranial nerve  functions were examined.  Other pertinent positive and negative findings on physical examination:   Breathing comfortably on room air, no stridor with deep inspiration   throat clearing throughout the visit     All other physical examination findings were within normal limits and noncontributory     Review of Relevant Clinical Data   Notes: 4/19/21 Rafa Dhillon      Labs:  Lab Results   Component Value Date    TSH 1.20 2019     Lab Results   Component Value Date     2020    CO2 25 2020    BUN 13 2020    PHOS 4.1 2019     Lab Results   Component Value Date    WBC 4.9 2019    HGB 15.5 2019    HCT 45.9 2019    MCV 87 2019     (L) 2019     No results found for: PT, PTT, INR  No results found for: AN  No components found for: RHEUMATOIDFACTOR,  RF  No results found for: CRP  No components found for: CKTOT, URICACID  No components found for: C3, C4, DSDNAAB, NDNAABIFA  No results found for: MPOAB    Patient reported Quality of Life (QOL) Measures   Patient Supplied Answers To VHI Questionnaire  No flowsheet data found.      Patient Supplied Answers To EAT Questionnaire  No flowsheet data found.      Patient Supplied Answers To CSI Questionnaire  No flowsheet data found.      Patient Supplied Answers to Dyspnea Index Questionnaire:  No flowsheet data found.    Impression & Plan     IMPRESSION: Mr. Villatoro is a 41 year old male who is being seen for the followin. Chronic throat clearing  - started 8 years ago   - has worsened sine COVID   - denies prior URI or surgery  - all day, rarely happens at night  - worse with talking, at rest and with cold weather or cold beverage   - sometimes it results in trouble breathing  - associated with sensation of dry throat and something there  - allergy triggers and work up: denies  - pulmonary triggers and work up: denies -able to play soccer without issues  - GI triggers and work up: sometimes at night, had gastric issues in the past and received medications, never had an EGD, no GI, not an active issue  - ENT triggers and work up: worse with talking and associated with dyspnea at times   - symptoms most likely due to irritable larynx syndrome  - throat cleared forcefully throughout the visit today  Plan  - scope with voice SLP  - consider candidacy for throat clearing  suppression therapy      RETURN VISIT: office    Thank you for the kind referral and for allowing me to share in the care of Mr. Villatoro. If you have any questions, please do not hesitate to contact me.    Roberta Roman MD    Laryngology    [facility]  Department of  Otolaryngology - Head and Neck Surgery  Clinics & Surgery Center  [address] [place] [address]  Appointment line: [phone]  Fax: [phone]      44 minutes spent on the date of the encounter doing chart review, patient visit and documentation

## 2021-06-15 NOTE — LETTER
6/15/2021       RE: Casi Villatoro  [address]  [address]  [place] [address]     Dear Colleague,    Thank you for referring your patient, Casi Villatoro, to the [facility] EAR NOSE AND THROAT CLINIC [place] at [facility]. Please see a copy of my visit note below.    Casi is a 41 year old who is being evaluated via a billable video visit.       How would you like to obtain your AVS? Mail a copy  If the video visit is dropped, the invitation should be resent by: Text to cell phone: [phone]  Will anyone else be joining your video visit? No      Video Start Time: 8:03  Video-Visit Details    Type of service:  Video Visit    Video End Time:8:42    Originating Location (pt. Location): Home    Distant Location (provider location):  [facility] EAR NOSE AND THROAT CLINIC [place]     Platform used for Video Visit: [facility]   at the [facility]   Otolaryngology Clinic     Patient: Casi Villatoro    MRN: [id number]    : 1980    Age/Gender: 41 year old male  Date of Service: 6/15/2021  Rendering Provider:   Roberta Roman MD     Referring Provider   PCP: Rafa Dhillon  Referring Physician: Rafa Dhillon MD  [address]  [place] [address]  Reason for Consultation   Chronic throat clearing  History   HISTORY OF PRESENT ILLNESS: I was asked to consult on Casi Villatoro, by Dr Rafa Dhillon for evaluation of dysphagia . Mr. Villatoro is a 41 year old male who presents to us today with chronic throat clearing      Today, he, presents for evaluation with an . History is difficult to obtain. He reports:  - Voice  My throat is dry   He feels something heavy around the throat   I m not getting air - difficulty of breathing   - He does not get air and has to make a throat clearing sound   8 years  During COVID times it got worse  He was going to [facility] - and was told it is gastric  - and he got some medication and then it came back to him  - The medication doesn t help him  - He doesn t feel it helps him    Dysphonia  - He has to throat clear sometimes because of something in his throat  - During cold and when he has trouble breathing his voice is not good  - Denies pain  - Denies strain  - Denies issues with singing    Dysphagia  - Denies    Dyspnea  - He drinks water which helps his breathing  -  it happens because the throat is dry   -  it happens when sleeping, sitting or standing   -  especially when I am sleeping   - Sometimes it wakes me up from my sleep  - Something is heavy on my throat  - He feels difficulty getting air out  - He feels the dryness of the throat and the feeling of throat clearing - everyday   - Waking him from his sleep it happens 3-4 months  - Rarely - months   - Beginning of April was the last time this happened   - The difficulty breathing comes from the dryness of the throat     Throat clearing/coughing  - He feels something in his throat that s why he does this   - For 8 years  - he doesn t remember how it started  - never had any throat procedures  - cold water makes it worse  - happens at rest  - sometimes worse with talking  - he plays soccer - doesn t throat clear during this  - during the winter     GERD/LPRD  - gastric issue 3 years ago  - he was prescribed medication  - it was in [place]  - didn t have an endoscopy  - in the AM - denies AM LPRD  -   Nasal congestion   - Denies    BEST  - Denies   Never had scope exam     PAST MEDICAL HISTORY:   Past Medical History:   Diagnosis Date     BMI 31.0-31.9,adult 6/20/2013     Hyperlipidemia LDL goal <100 4/16/2013       PAST SURGICAL HISTORY:   Past Surgical History:   Procedure Laterality Date     NO HISTORY OF SURGERY  2013       CURRENT MEDICATIONS:   Current Outpatient Medications:      blood glucose (NO BRAND SPECIFIED) test strip, Use to test blood sugar 4 times daily or as directed., Disp: 200 strip, Rfl:  3     blood glucose monitoring (ACCU-CHEK MULTICLIX) lancets, Use to test blood sugar 4 times daily or as directed., Disp: 200 each, Rfl: 3     famotidine (PEPCID) 20 MG tablet, Take 1 tablet (20 mg) by mouth 2 times daily, Disp: 28 tablet, Rfl: 1     insulin aspart (NOVOLOG PEN) 100 UNIT/ML pen, 5-10 units per meal (5 units small meal.  10 units large meal), Disp: 15 mL, Rfl: 0     insulin glargine (LANTUS SOLOSTAR) 100 UNIT/ML pen, INJECT 42 UNITS UNDER THE SKIN ONCE DAILY, Disp: 15 mL, Rfl: 1     insulin pen needle (32G X 4 MM) 32G X 4 MM miscellaneous, Use 4 pen needles daily or as directed., Disp: 100 each, Rfl: 3    ALLERGIES: Patient has no known allergies.    SOCIAL HISTORY:    Social History     Socioeconomic History     Marital status:      Spouse name: Hillary Diehl     Number of children: 0     Years of education: Not on file     Highest education level: Not on file   Occupational History     Employer: UNEMPLOYED   Social Needs     Financial resource strain: Not on file     Food insecurity     Worry: Not on file     Inability: Not on file     Transportation needs     Medical: Not on file     Non-medical: Not on file   Tobacco Use     Smoking status: Never Smoker     Smokeless tobacco: Never Used   Substance and Sexual Activity     Alcohol use: No     Drug use: No     Sexual activity: Yes     Partners: Female   Lifestyle     Physical activity     Days per week: Not on file     Minutes per session: Not on file     Stress: Not on file   Relationships     Social connections     Talks on phone: Not on file     Gets together: Not on file     Attends Pentecostal service: Not on file     Active member of club or organization: Not on file     Attends meetings of clubs or organizations: Not on file     Relationship status: Not on file     Intimate partner violence     Fear of current or ex partner: Not on file     Emotionally abused: Not on file     Physically abused: Not on file     Forced sexual activity: Not  on file   Other Topics Concern     Parent/sibling w/ CABG, MI or angioplasty before 65F 55M? Not Asked   Social History Narrative     Not on file         FAMILY HISTORY:   Family History   Problem Relation Age of Onset     Cancer - colorectal No family hx of      Prostate Cancer No family hx of      Non-contributory for problems with anesthesia    REVIEW OF SYSTEMS:   The patient was asked a 14 point review of systems regarding constitutional symptoms, eye symptoms, ears, nose, mouth, throat symptoms, cardiovascular symptoms, respiratory symptoms, gastrointestinal symptoms, genitourinary symptoms, musculoskeletal symptoms, integumentary symptoms, neurological symptoms, psychiatric symptoms, endocrine symptoms, hematologic/lymphatic symptoms, and allergic/ immunologic symptoms.   The pertinent factors have been included in the HPI and below.  Patient Supplied Answers to Review of Systems  No flowsheet data found.    Physical Examination   The patient underwent a physical examination as described below. The pertinent positive and negative findings are summarized after the description of the examination.    Constitutional: The patient's developmental and nutritional status was assessed. The patient's voice quality was assessed.  Head and Face: The head and face were inspected for deformities. Facial muscle strength was assessed bilaterally.  Eyes: Extraocular movements and primary gaze alignment were assessed.  Ears, Nose, Mouth and Throat: The ears and nose were examined for deformities.The lips were examined for abnormalities.   Neck: The neck was visualized for abnormal neck masses  Respiratory: The nature of the breathing was observed.  Extremities: The hand extremities were examined for any clubbing or cyanosis.  Skin: The skin was examined for inflammatory or neoplastic conditions.  Neurologic: The patient's orientation, mood, and affect were noted. The cranial nerve  functions were examined.  Other pertinent  positive and negative findings on physical examination:   Breathing comfortably on room air, no stridor with deep inspiration   throat clearing throughout the visit     All other physical examination findings were within normal limits and noncontributory     Review of Relevant Clinical Data   Notes: 21 Rafa Dhillon     Labs:  Lab Results   Component Value Date    TSH 1.20 2019     Lab Results   Component Value Date     2020    CO2 25 2020    BUN 13 2020    PHOS 4.1 2019     Lab Results   Component Value Date    WBC 4.9 2019    HGB 15.5 2019    HCT 45.9 2019    MCV 87 2019     (L) 2019     No results found for: PT, PTT, INR  No results found for: AN  No components found for: RHEUMATOIDFACTOR,  RF  No results found for: CRP  No components found for: CKTOT, URICACID  No components found for: C3, C4, DSDNAAB, NDNAABIFA  No results found for: MPOAB    Patient reported Quality of Life (QOL) Measures   Patient Supplied Answers To VHI Questionnaire  No flowsheet data found.      Patient Supplied Answers To EAT Questionnaire  No flowsheet data found.      Patient Supplied Answers To CSI Questionnaire  No flowsheet data found.      Patient Supplied Answers to Dyspnea Index Questionnaire:  No flowsheet data found.    Impression & Plan     IMPRESSION: Mr. Villatoro is a 41 year old male who is being seen for the followin. Chronic throat clearing  - started 8 years ago   - has worsened sine COVID   - denies prior URI or surgery  - all day, rarely happens at night  - worse with talking, at rest and with cold weather or cold beverage   - sometimes it results in trouble breathing  - associated with sensation of dry throat and something there  - allergy triggers and work up: denies  - pulmonary triggers and work up: denies -able to play soccer without issues  - GI triggers and work up: sometimes at night, had gastric issues in the past and received  medications, never had an EGD, no GI, not an active issue  - ENT triggers and work up: worse with talking and associated with dyspnea at times   - symptoms most likely due to irritable larynx syndrome  - throat cleared forcefully throughout the visit today  Plan  - scope with voice SLP  - consider candidacy for throat clearing suppression therapy      RETURN VISIT: office    Thank you for the kind referral and for allowing me to share in the care of Mr. Villatoro. If you have any questions, please do not hesitate to contact me.    Roberta Roman MD    Laryngology    [facility]  Department of  Otolaryngology - Head and Neck Surgery  Clinics & Surgery Center  [address] [place] [address]  Appointment line: [phone]  Fax: [phone]      44 minutes spent on the date of the encounter doing chart review, patient visit and documentation       Again, thank you for allowing me to participate in the care of your patient.      Sincerely,    Roberta Roman MD

## 2021-06-16 NOTE — PROGRESS NOTES
[facility]   at the [facility]   Otolaryngology Clinic     Patient: Casi Villatoro    MRN: [id number]    : 1980    Age/Gender: 41 year old male  Date of Service: 2021  Rendering Provider:   Roberta Roman MD     Chief Complaint   Chronic throat clearing  Interval History   HISTORY OF PRESENT ILLNESS: Mr. Villatoro is a 41 year old male is being followed for throat clearing. He was initially seen on 6/15/21. Please refer to this note for full history.      Today, he presents for follow up. he reports:  - stable symptoms     Dysphonia: Patient denies dysphonia. This is stable      Dysphagia: Patient denies dysphagia. This is stable     Dyspnea: Patient reports dyspnea. This is stable     Throat clearing/Chronic cough: Patient reports throat clearing/chronic cough. This is stable     LPRD/GERD: Patient reports LPRD/GERD symptoms. This is stable     PAST MEDICAL HISTORY:   Past Medical History:   Diagnosis Date     BMI 31.0-31.9,adult 2013     Hyperlipidemia LDL goal <100 2013       PAST SURGICAL HISTORY:   Past Surgical History:   Procedure Laterality Date     NO HISTORY OF SURGERY         CURRENT MEDICATIONS:   Current Outpatient Medications:      blood glucose (NO BRAND SPECIFIED) test strip, Use to test blood sugar 4 times daily or as directed., Disp: 200 strip, Rfl: 3     blood glucose monitoring (ACCU-CHEK MULTICLIX) lancets, Use to test blood sugar 4 times daily or as directed., Disp: 200 each, Rfl: 3     famotidine (PEPCID) 20 MG tablet, Take 1 tablet (20 mg) by mouth 2 times daily, Disp: 28 tablet, Rfl: 1     insulin aspart (NOVOLOG PEN) 100 UNIT/ML pen, 5-10 units per meal (5 units small meal.  10 units large meal), Disp: 15 mL, Rfl: 0     insulin glargine (LANTUS SOLOSTAR) 100 UNIT/ML pen, INJECT 42 UNITS UNDER THE SKIN ONCE DAILY, Disp: 15 mL, Rfl: 1     insulin pen needle (32G X 4 MM) 32G X 4 MM miscellaneous, Use 4 pen needles daily or as directed., Disp: 100  each, Rfl: 3    ALLERGIES: Patient has no known allergies.    SOCIAL HISTORY:    Social History     Socioeconomic History     Marital status:      Spouse name: Hillary Diehl     Number of children: 0     Years of education: Not on file     Highest education level: Not on file   Occupational History     Employer: UNEMPLOYED   Social Needs     Financial resource strain: Not on file     Food insecurity     Worry: Not on file     Inability: Not on file     Transportation needs     Medical: Not on file     Non-medical: Not on file   Tobacco Use     Smoking status: Never Smoker     Smokeless tobacco: Never Used   Substance and Sexual Activity     Alcohol use: No     Drug use: No     Sexual activity: Yes     Partners: Female   Lifestyle     Physical activity     Days per week: Not on file     Minutes per session: Not on file     Stress: Not on file   Relationships     Social connections     Talks on phone: Not on file     Gets together: Not on file     Attends Confucianism service: Not on file     Active member of club or organization: Not on file     Attends meetings of clubs or organizations: Not on file     Relationship status: Not on file     Intimate partner violence     Fear of current or ex partner: Not on file     Emotionally abused: Not on file     Physically abused: Not on file     Forced sexual activity: Not on file   Other Topics Concern     Parent/sibling w/ CABG, MI or angioplasty before 65F 55M? Not Asked   Social History Narrative     Not on file         FAMILY HISTORY:   Family History   Problem Relation Age of Onset     Cancer - colorectal No family hx of      Prostate Cancer No family hx of       Non-contributory for problems with anesthesia    REVIEW OF SYSTEMS:   The patient was asked a 14 point review of systems regarding constitutional symptoms, eye symptoms, ears, nose, mouth, throat symptoms, cardiovascular symptoms, respiratory symptoms, gastrointestinal symptoms, genitourinary symptoms,  musculoskeletal symptoms, integumentary symptoms, neurological symptoms, psychiatric symptoms, endocrine symptoms, hematologic/lymphatic symptoms, and allergic/ immunologic symptoms.   The pertinent factors have been included in the HPI and below.  Patient Supplied Answers to Review of Systems  No flowsheet data found.    Physical Examination   The patient underwent a physical examination as described below. The pertinent positive and negative findings are summarized after the description of the examination.  Constitutional: The patient's developmental and nutritional status was assessed. The patient's voice quality was assessed.  Head and Face: The head and face were inspected for deformities. The sinuses were palpated. The salivary glands were palpated. Facial muscle strength was assessed bilaterally.  Eyes: Extraocular movements and primary gaze alignment were assessed.  Ears, Nose, Mouth and Throat: The ears and nose were examined for deformities. The nasal septum, mucosa, and turbinates were inspected by anterior rhinoscopy. The lips, teeth, and gums were examined for abnormalities. The oral mucosa, tongue, palate, tonsils, lateral and posterior pharynx were inspected for the presence of asymmetry or mucosal lesions.    Neck: The tracheal position was noted, and the neck mass palpated to determine if there were any asymmetries, abnormal neck masses, thyromegally, or thyroid nodules.  Respiratory: The nature of the breathing and chest expansion/symmetry was observed.  Cardiovascular: The patient was examined to determine the presence of any edema or jugular venous distension.  Abdomen: The contour of the abdomen was noted.  Lymphatic: The patient was examined for infraclavicular lymphadenopathy.  Musculoskeletal: The patient was inspected for the presence of skeletal deformities.  Extremities: The extremities were examined for any clubbing or cyanosis.  Skin: The skin was examined for inflammatory or neoplastic  conditions.  Neurologic: The patient's orientation, mood, and affect were noted. The cranial nerve  functions were examined.  Other pertinent positive and negative findings on physical examination:   OC/OP: no lesions, uvula midline, soft palate elevates symmetrically, FOM/BOT soft  Neck: no lesions, no TH tenderness to palpation or need to cough or throat clear  All other physical examination findings were within normal limits and noncontributory.    Procedures   Flexible laryngoscopy (CPT 63391)      Pre-procedure diagnosis: dysphagia  Post-procedure diagnosis: same as above  Indication for procedure: Mr. Villatoro is a 41 year old male with see above  Procedure(s): Fiberoptic Laryngoscopy    Details of Procedure: After informed consent was obtained, the patient was seated in the examination chair.  The areas of the nasopharynx as well as the hypopharynx were anesthetized with topical 4% lidocaine with 0.25% phenylephrine atomizer.  Examination of the base of tongue was performed first.  Attention was directed to any evidence of masses in the area or evidence of leukoplakia or candidal infection.  Attention was directed to the epiglottis where its size and position was determined and its movement on phonation of the vowel  e .  The piriform sinuses were then inspected for any mass lesions or pooling of secretions.  Attention was then directed to the larynx. The vocal folds were inspected for infection or any areas of leukoplakia, for masses, polypoid degeneration, or hemorrhage.  Having done this, the arytenoids and vocal processes were inspected for erythema or evidence of granuloma formation.  The posterior commissure was then inspected for evidence of inflammatory changes in the mucosa and heaping up of mucosal tissue. The patient was then instructed to say the vowel  e .  Adduction of vocal folds to the midline was observed for any evidence of paresis or paralysis of the larynx or asymmetry in rotation of the  larynx to the left or right. The patient was asked to breathe and the degree of abduction was noted bilaterally.  Subglottic view of the larynx was obtained for any additional mass lesions or mucosal changes.  Finally the post cricoid was examined for evidence of pooling of secretions, as well as the pharyngeal wall mucosa.   Anesthesia type: 0.25% phenylephrine    Findings:  Anatomic/physiological deviations: no extra mucus    Right vocal process: No restriction of mobility   Left vocal process: No restriction of mobility  Glottal gap: Complete glottal closure  Supraglottic structures: Normal  Hypopharynx: Normal     Estimated Blood Loss: minimal  Complications: None  Disposition: Patient tolerated the procedure well                Review of Relevant Clinical Data      Labs:  Lab Results   Component Value Date    TSH 1.20 2019     Lab Results   Component Value Date     2020    CO2 25 2020    BUN 13 2020    PHOS 4.1 2019     Lab Results   Component Value Date    WBC 4.9 2019    HGB 15.5 2019    HCT 45.9 2019    MCV 87 2019     (L) 2019     No results found for: PT, PTT, INR  No results found for: AN  No components found for: RHEUMATOIDFACTOR,  RF  No results found for: CRP  No components found for: CKTOT, URICACID  No components found for: C3, C4, DSDNAAB, NDNAABIFA  No results found for: MPOAB    Patient reported Quality of Life (QOL) Measures   Patient Supplied Answers To VHI Questionnaire  No flowsheet data found.      Patient Supplied Answers To EAT Questionnaire  No flowsheet data found.      Patient Supplied Answers To CSI Questionnaire  No flowsheet data found.           Impression & Plan     IMPRESSION: Mr. Villatoro is a 41 year old male who is being seen for the followin. Chronic throat clearing  - started 8 years ago   - has worsened sine COVID   - denies prior URI or surgery  - all day, rarely happens at night  - worse with  talking, at rest and with cold weather or cold beverage   - sometimes it results in trouble breathing  - associated with sensation of dry throat and something there  - allergy triggers and work up: denies  - pulmonary triggers and work up: denies -able to play soccer without issues  - GI triggers and work up: sometimes at night, had gastric issues in the past and received medications, never had an EGD, no GI, not an active issue  - ENT triggers and work up: worse with talking and associated with dyspnea at times   - symptoms most likely due to irritable larynx syndrome  - throat cleared forcefully throughout the visit today  - scope shows no pooling of saliva  Plan  - throat clearing suppression therapy       RETURN VISIT: as needed     Roberta Roman MD    Laryngology    [facility]  Department of  Otolaryngology - Head and Neck Surgery  Clinics [place] [address]  Appointment line: [phone]  Fax: [phone]  [email or web address]

## 2021-06-16 NOTE — PATIENT INSTRUCTIONS
1.  You were seen in the ENT Clinic today by . If you have any questions or concerns after your appointment, please call [phone]. Press option #1 for scheduling related needs. Press option #3 for Nurse advice.    2.  Plan is to return to clinic 6/17/21      Xenia Pratt LPN  [phone]  [facility] - Otolaryngology

## 2021-06-17 ENCOUNTER — OFFICE VISIT (OUTPATIENT)
Dept: OTOLARYNGOLOGY | Facility: CLINIC | Age: 41
End: 2021-06-17
Payer: COMMERCIAL

## 2021-06-17 ENCOUNTER — VIRTUAL VISIT (OUTPATIENT)
Dept: OTOLARYNGOLOGY | Facility: CLINIC | Age: 41
End: 2021-06-17
Payer: COMMERCIAL

## 2021-06-17 VITALS
HEIGHT: 65 IN | TEMPERATURE: 97.3 F | HEART RATE: 73 BPM | BODY MASS INDEX: 30.32 KG/M2 | WEIGHT: 182 LBS | OXYGEN SATURATION: 97 %

## 2021-06-17 DIAGNOSIS — R06.02 SHORTNESS OF BREATH: ICD-10-CM

## 2021-06-17 DIAGNOSIS — R49.0 DYSPHONIA: Primary | ICD-10-CM

## 2021-06-17 DIAGNOSIS — R09.89 CHRONIC THROAT CLEARING: Primary | ICD-10-CM

## 2021-06-17 DIAGNOSIS — R49.0 DYSPHONIA: ICD-10-CM

## 2021-06-17 PROCEDURE — 99207 PR NO CHARGE LOS: CPT | Performed by: OTOLARYNGOLOGY

## 2021-06-17 PROCEDURE — 31575 DIAGNOSTIC LARYNGOSCOPY: CPT | Performed by: OTOLARYNGOLOGY

## 2021-06-17 PROCEDURE — 92524 BEHAVRAL QUALIT ANALYS VOICE: CPT | Mod: GN | Performed by: SPEECH-LANGUAGE PATHOLOGIST

## 2021-06-17 PROCEDURE — 99207 PR NO CHARGE LOS: CPT | Performed by: SPEECH-LANGUAGE PATHOLOGIST

## 2021-06-17 ASSESSMENT — MIFFLIN-ST. JEOR: SCORE: 1662.43

## 2021-06-17 ASSESSMENT — PAIN SCALES - GENERAL: PAINLEVEL: NO PAIN (0)

## 2021-06-17 NOTE — NURSING NOTE
"Chief Complaint   Patient presents with     RECHECK     follow up       Pulse 73, temperature 97.3  F (36.3  C), temperature source Temporal, height 1.651 m (5' 5\"), weight 82.6 kg (182 lb), SpO2 97 %.    Saul Willis, EMT  "

## 2021-06-17 NOTE — LETTER
6/17/2021       RE: Casi Villatoro  [address]  [place] [address]     Dear Colleague,    Thank you for referring your patient, Casi Villatoro, to the [facility]ICE [facility] [place] [facility]. Please see a copy of my visit note below.    Casi Villatoro is a 41 year old male who is being evaluated via a billable video visit.      Casi has been notified and verbally consented to the following:     This video visit will be conducted between you and your provider.    Patient has opted to conduct today's video visit vs an in-person appointment.     Video visits are billed at different rates depending on your insurance coverage. Please reach out to your insurance provider with any questions.     If during the course of the call the provider feels the appointment is not appropriate, you will not be charged for this service.  Provider has received verbal consent for billable virtual visit from the patient? Yes  Will anyone else be joining your video visit? This is a joint visit with Dr. Roman, who is seeing the patient in the clinic; we are also joined by the [facility]  by phone    Call initiated at: 10:50 AM   Type of Visit Platform Used: [employer] Video  Location of provider: Home  Location of patient: [facility]  Robert Lozano Jr., M.D., F.A.C.S.  Modesta Bateman M.D., M.P.H.  Roberta Roman M.D.  Alexandria Sanz, Ph.D., CCC-SLP  Juhi Pickens M.M. (voice), M.A., CCC-SLP  Jj Cordova M.M. (voice), M.A., CCC-SLP  RADHA Barrett (voice), M.S., CCC-SLP    [facility]  VOICE/SPEECH/BREATHING EVALUATION AND LARYNGEAL EXAMINATION REPORT    Patient: Casi Villatoro  Date of Visit: 6/17/2021    Clinician: Alexandria Sanz, Ph.D., CCC/SLP  Seen in conjunction with: Dr. Roberta Roman    CHIEF COMPLAINT:  Throat-clearing    HISTORY  Casi Villatoro is a 41 year old presenting today for evaluation of throat-clearing  and occasional dyspnea.    Please refer to Dr. Roman s dictation for a more complete history and impressions.   Salient details of his history are as follows:    8-year history of throat clearing and episodes of shortness of breath    Worse when he had Covid    Shortness of breath episodes can be nocturnal as well as throughout the day though the nocturnal episodes are rare (none since April)    Feels the shortness of breath comes on because his throat is dry    Primary complaint is throat clearing    Sometimes productive, then very thin secretions    Often the throat clear is nonproductive and dry    Throat clearing does seem to be related to voice use; voice use is not loud    Throat clearing also Nexium, and that he can't sleep    DIAGNOSIS/REASON FOR REFERRAL  Throat clearing/ Evaluate, perform laryngeal exam, treat as appropriate    CURRENT SYMPTOMS INCLUDE:  VOICE    No complaints, but voice use does seem to increase throat clearing  THROAT CLEARING    His primary complaint, sometimes productive    Feels this happens because of his dry throat  SWALLOWING    No complaint  SHORTNESS OF BREATH    Episodes that seem to be related to sensation of dryness of throat    Occasionally nocturnal    Denies significant dyspnea, dysphagia, or pain    OTHER PERTINENT HISTORY    Unknown; please refer to Dr. Roman's note    OBJECTIVE FINDINGS  VOICE/ SPEECH/ NON-COMMUNICATIVE LARYNGEAL BEHAVIORS EVALUATION  An evaluation of the voice and throat clearing was accomplished today; salient features are as follows:    Palpation of the laryngeal area shows:    No tenderness of the thyrohyoid area     Palpation does not elicit the sensation of needing to throat clear    Cough/ Throat clear:    Occasional    Dry, but mildly rough sounding    Locus of cough/ throat clear: sounds consistent with upper airway    Marked in severity; the throat clear is very strong    Breathing pattern:    Appears within normal limits and adequate; difficult  to fully assess in this virtual visit    No overt tension is evident.    Voice quality is characterized by    Within normal limits; clear quality, not particularly depressed or narrow    Habitual pitch is within normal limits, in the range from C3 to G3    Loudness is WNL and appropriate for the setting    A singing task shows voice quality is improved in /consistent between speech and singing    In a pitch glide task to determine pitch range, he demonstrates within normal limits pitch range    LARYNGEAL EXAMINATION    Endoscopic laryngeal examination was performed by:  Dr. Roman  I provided the protocol of instructions for the patient.  Type of exam:   Flexible endoscopy with chip-tip technology and stroboscopy  This exam shows:    Laryngeal and Vocal Fold Mucosa    Essentially healthy laryngeal mucosa, but does appear somewhat dry    moderate presence of dry thickened secretions on the vocal folds and throughout the laryngeal area    Status of vocal fold mucosa:   o Within normal limits, with no lesions and straight vibratory margins    Neurological and Functional Integrity of the Larynx    Vocal folds are mobile and meet at midline; movement is brisk and symmetric; exam is neurologically normal     Airway is WNL    There is some evidence of paradoxical motion of the vocal folds during today's examination    Some incoordination of vocal fold movement is evident during a task of 20 quickly repeated vowels    Elongation of the vocal folds for pitch increase is within normal limits    On phonation, glottic closure is within normal limits; complete    Demonstration of throat clear shows that the throat clear is highly compatible to the glottis and to the supraglottic structures, especially the epiglottis    Supraglottic Function and Therapy Probes    Moderate to marked anterior-posterior constriction of the supraglottic larynx during connected speech    Mild ventricular approximation during phonation;      Hyperadduction of the posterior glottis during phonation    Supraglottic function during singing is the same as in speech.    Stroboscopy was not warranted.    Dr. Roman and I reviewed this laryngeal exam with Mr. Villatoro today, and I provided pertinent explanations:    Endoscopic findings are consistent with audio-perceptual assessment and patient Hx/complaints.    his symptoms are accounted for by the high impact to his laryngeal mucosa during throat clearing, which can lead to sensation of dryness and irritation; the brief moments of paradoxical vocal fold motion seem to be due to incoordination, and can likely be remediated functionally; there is also marked supraglottic hyperfunction, that may also result in irritation and sense of dryness    Because there appears to be a functional component to his symptoms, he would most likely benefit from functional speech therapy.    ASSESSMENT / PLAN  IMPRESSIONS:  This evaluation has resulted in the following diagnosis/diagnoses for Mr. Villatoro  Chronic Throat Clearing (R68.89)  Shortness Of Breath (R06.02)  Dysphonia (R49.0).      Laryngeal evaluation demonstrated high impact throat clearing, and marked supraglottic hyperfunction, with some evidence of paradoxical vocal fold movement    Perceptual evaluation demonstrated within normal limits voice quality, but very strong throat clear  RECOMMENDATIONS:     A course of speech therapy is recommended to optimize vocal technique and help reduce throat clear, mucosal irritation and episodes of shortness of breath.    He demonstrates a Good prognosis for improvement given adherence to therapeutic recommendations. Therapeutic     Positive indicators: commitment to process; diagnosis is known to respond to functional treatment;     Negative indicators: none, although from visits with  can be somewhat limiting.    TREATMENT PLAN  Speech therapy    DURATION/FREQUENCY OF TREATMENT  Six weekly or bi-weekly one-hour  sessions, with three monthly one-hour follow-up sessions    GOALS  Patient goal:    To understand the problem and fix it as much as possible  To reduce his throat-clearing to acceptable levels  To breathe normally and comfortably in all situations, with no reports of episodes of shortness of breath    Long-term goal(s): In 8 months, Mr. Villatoro will:  1.  Report a week with no more than two episodes of throat-clearing per day, that do not last more than two seconds  2.  Report a week with no episodes of shortness of breath, and no sensation of dryness of the throat.    This treatment plan was developed with the patient who agreed with the recommendations.    TOTAL SERVICE TIME: 30 minutes  EVALUATION OF VOICE AND RESONANCE (43578)  NO CHARGE FACILITY FEE       Alexandria Sanz, Ph.D., [facility]-SLP  Speech-Language Pathologist  Director, [facility]  [phone]

## 2021-06-17 NOTE — LETTER
2021       RE: Casi Villatoro  [address]  [address]  [place] [address]     Dear Colleague,    Thank you for referring your patient, Casi Villatoro, to the [facility] [place] at [facility]. Please see a copy of my visit note below.        [facility]   at the [facility]   Otolaryngology Clinic     Patient: Casi Villatoro    MRN: [id number]    : 1980    Age/Gender: 41 year old male  Date of Service: 2021  Rendering Provider:   Roberta Roman MD     Chief Complaint   Chronic throat clearing  Interval History   HISTORY OF PRESENT ILLNESS: Mr. Villatoro is a 41 year old male is being followed for throat clearing. He was initially seen on 6/15/21. Please refer to this note for full history.      Today, he presents for follow up. he reports:  - stable symptoms     Dysphonia: Patient denies dysphonia. This is stable      Dysphagia: Patient denies dysphagia. This is stable     Dyspnea: Patient reports dyspnea. This is stable     Throat clearing/Chronic cough: Patient reports throat clearing/chronic cough. This is stable     LPRD/GERD: Patient reports LPRD/GERD symptoms. This is stable     PAST MEDICAL HISTORY:   Past Medical History:   Diagnosis Date     BMI 31.0-31.9,adult 2013     Hyperlipidemia LDL goal <100 2013       PAST SURGICAL HISTORY:   Past Surgical History:   Procedure Laterality Date     NO HISTORY OF SURGERY         CURRENT MEDICATIONS:   Current Outpatient Medications:      blood glucose (NO BRAND SPECIFIED) test strip, Use to test blood sugar 4 times daily or as directed., Disp: 200 strip, Rfl: 3     blood glucose monitoring (ACCU-CHEK MULTICLIX) lancets, Use to test blood sugar 4 times daily or as directed., Disp: 200 each, Rfl: 3     famotidine (PEPCID) 20 MG tablet, Take 1 tablet (20 mg) by mouth 2 times daily, Disp: 28 tablet, Rfl: 1     insulin aspart (NOVOLOG PEN) 100 UNIT/ML  pen, 5-10 units per meal (5 units small meal.  10 units large meal), Disp: 15 mL, Rfl: 0     insulin glargine (LANTUS SOLOSTAR) 100 UNIT/ML pen, INJECT 42 UNITS UNDER THE SKIN ONCE DAILY, Disp: 15 mL, Rfl: 1     insulin pen needle (32G X 4 MM) 32G X 4 MM miscellaneous, Use 4 pen needles daily or as directed., Disp: 100 each, Rfl: 3    ALLERGIES: Patient has no known allergies.    SOCIAL HISTORY:    Social History     Socioeconomic History     Marital status:      Spouse name: Hillary Diehl     Number of children: 0     Years of education: Not on file     Highest education level: Not on file   Occupational History     Employer: UNEMPLOYED   Social Needs     Financial resource strain: Not on file     Food insecurity     Worry: Not on file     Inability: Not on file     Transportation needs     Medical: Not on file     Non-medical: Not on file   Tobacco Use     Smoking status: Never Smoker     Smokeless tobacco: Never Used   Substance and Sexual Activity     Alcohol use: No     Drug use: No     Sexual activity: Yes     Partners: Female   Lifestyle     Physical activity     Days per week: Not on file     Minutes per session: Not on file     Stress: Not on file   Relationships     Social connections     Talks on phone: Not on file     Gets together: Not on file     Attends Gnosticism service: Not on file     Active member of club or organization: Not on file     Attends meetings of clubs or organizations: Not on file     Relationship status: Not on file     Intimate partner violence     Fear of current or ex partner: Not on file     Emotionally abused: Not on file     Physically abused: Not on file     Forced sexual activity: Not on file   Other Topics Concern     Parent/sibling w/ CABG, MI or angioplasty before 65F 55M? Not Asked   Social History Narrative     Not on file         FAMILY HISTORY:   Family History   Problem Relation Age of Onset     Cancer - colorectal No family hx of      Prostate Cancer No family hx  of       Non-contributory for problems with anesthesia    REVIEW OF SYSTEMS:   The patient was asked a 14 point review of systems regarding constitutional symptoms, eye symptoms, ears, nose, mouth, throat symptoms, cardiovascular symptoms, respiratory symptoms, gastrointestinal symptoms, genitourinary symptoms, musculoskeletal symptoms, integumentary symptoms, neurological symptoms, psychiatric symptoms, endocrine symptoms, hematologic/lymphatic symptoms, and allergic/ immunologic symptoms.   The pertinent factors have been included in the HPI and below.  Patient Supplied Answers to Review of Systems  No flowsheet data found.    Physical Examination   The patient underwent a physical examination as described below. The pertinent positive and negative findings are summarized after the description of the examination.  Constitutional: The patient's developmental and nutritional status was assessed. The patient's voice quality was assessed.  Head and Face: The head and face were inspected for deformities. The sinuses were palpated. The salivary glands were palpated. Facial muscle strength was assessed bilaterally.  Eyes: Extraocular movements and primary gaze alignment were assessed.  Ears, Nose, Mouth and Throat: The ears and nose were examined for deformities. The nasal septum, mucosa, and turbinates were inspected by anterior rhinoscopy. The lips, teeth, and gums were examined for abnormalities. The oral mucosa, tongue, palate, tonsils, lateral and posterior pharynx were inspected for the presence of asymmetry or mucosal lesions.    Neck: The tracheal position was noted, and the neck mass palpated to determine if there were any asymmetries, abnormal neck masses, thyromegally, or thyroid nodules.  Respiratory: The nature of the breathing and chest expansion/symmetry was observed.  Cardiovascular: The patient was examined to determine the presence of any edema or jugular venous distension.  Abdomen: The contour of the  abdomen was noted.  Lymphatic: The patient was examined for infraclavicular lymphadenopathy.  Musculoskeletal: The patient was inspected for the presence of skeletal deformities.  Extremities: The extremities were examined for any clubbing or cyanosis.  Skin: The skin was examined for inflammatory or neoplastic conditions.  Neurologic: The patient's orientation, mood, and affect were noted. The cranial nerve  functions were examined.  Other pertinent positive and negative findings on physical examination:   OC/OP: no lesions, uvula midline, soft palate elevates symmetrically, FOM/BOT soft  Neck: no lesions, no TH tenderness to palpation or need to cough or throat clear  All other physical examination findings were within normal limits and noncontributory.    Procedures   Flexible laryngoscopy (CPT 49887)      Pre-procedure diagnosis: dysphagia  Post-procedure diagnosis: same as above  Indication for procedure: Mr. Villatoro is a 41 year old male with see above  Procedure(s): Fiberoptic Laryngoscopy    Details of Procedure: After informed consent was obtained, the patient was seated in the examination chair.  The areas of the nasopharynx as well as the hypopharynx were anesthetized with topical 4% lidocaine with 0.25% phenylephrine atomizer.  Examination of the base of tongue was performed first.  Attention was directed to any evidence of masses in the area or evidence of leukoplakia or candidal infection.  Attention was directed to the epiglottis where its size and position was determined and its movement on phonation of the vowel  e .  The piriform sinuses were then inspected for any mass lesions or pooling of secretions.  Attention was then directed to the larynx. The vocal folds were inspected for infection or any areas of leukoplakia, for masses, polypoid degeneration, or hemorrhage.  Having done this, the arytenoids and vocal processes were inspected for erythema or evidence of granuloma formation.  The posterior  commissure was then inspected for evidence of inflammatory changes in the mucosa and heaping up of mucosal tissue. The patient was then instructed to say the vowel  e .  Adduction of vocal folds to the midline was observed for any evidence of paresis or paralysis of the larynx or asymmetry in rotation of the larynx to the left or right. The patient was asked to breathe and the degree of abduction was noted bilaterally.  Subglottic view of the larynx was obtained for any additional mass lesions or mucosal changes.  Finally the post cricoid was examined for evidence of pooling of secretions, as well as the pharyngeal wall mucosa.   Anesthesia type: 0.25% phenylephrine    Findings:  Anatomic/physiological deviations: no extra mucus    Right vocal process: No restriction of mobility   Left vocal process: No restriction of mobility  Glottal gap: Complete glottal closure  Supraglottic structures: Normal  Hypopharynx: Normal     Estimated Blood Loss: minimal  Complications: None  Disposition: Patient tolerated the procedure well                Review of Relevant Clinical Data      Labs:  Lab Results   Component Value Date    TSH 1.20 06/27/2019     Lab Results   Component Value Date     09/21/2020    CO2 25 09/21/2020    BUN 13 09/21/2020    PHOS 4.1 06/26/2019     Lab Results   Component Value Date    WBC 4.9 06/26/2019    HGB 15.5 06/26/2019    HCT 45.9 06/26/2019    MCV 87 06/26/2019     (L) 06/26/2019     No results found for: PT, PTT, INR  No results found for: AN  No components found for: RHEUMATOIDFACTOR,  RF  No results found for: CRP  No components found for: CKTOT, URICACID  No components found for: C3, C4, DSDNAAB, NDNAABIFA  No results found for: MPOAB    Patient reported Quality of Life (QOL) Measures   Patient Supplied Answers To VHI Questionnaire  No flowsheet data found.      Patient Supplied Answers To EAT Questionnaire  No flowsheet data found.      Patient Supplied Answers To CSI  Questionnaire  No flowsheet data found.           Impression & Plan     IMPRESSION: Mr. Villatoro is a 41 year old male who is being seen for the followin. Chronic throat clearing  - started 8 years ago   - has worsened sine COVID   - denies prior URI or surgery  - all day, rarely happens at night  - worse with talking, at rest and with cold weather or cold beverage   - sometimes it results in trouble breathing  - associated with sensation of dry throat and something there  - allergy triggers and work up: denies  - pulmonary triggers and work up: denies -able to play soccer without issues  - GI triggers and work up: sometimes at night, had gastric issues in the past and received medications, never had an EGD, no GI, not an active issue  - ENT triggers and work up: worse with talking and associated with dyspnea at times   - symptoms most likely due to irritable larynx syndrome  - throat cleared forcefully throughout the visit today  - scope shows no pooling of saliva  Plan  - throat clearing suppression therapy       RETURN VISIT: as needed     Roberta Roman MD    Laryngology    [facility]  Department of  Otolaryngology - Head and Neck Surgery  [facility] [place] [address]  Appointment line: [phone]  Fax: [phone]  [email or web address]

## 2021-06-17 NOTE — PROGRESS NOTES
Casi Villatoro is a 41 year old male who is being evaluated via a billable video visit.      Casi has been notified and verbally consented to the following:     This video visit will be conducted between you and your provider.    Patient has opted to conduct today's video visit vs an in-person appointment.     Video visits are billed at different rates depending on your insurance coverage. Please reach out to your insurance provider with any questions.     If during the course of the call the provider feels the appointment is not appropriate, you will not be charged for this service.  Provider has received verbal consent for billable virtual visit from the patient? Yes  Will anyone else be joining your video visit? This is a joint visit with Dr. Roman, who is seeing the patient in the clinic; we are also joined by the \[facility]\""  by phone    Call initiated at: 10:50 AM   Type of Visit Platform Used: W4 Video  Location of provider: Home  Location of patient: [facility]  Robert Lozano Jr., M.D., F.A.C.S.  Modesta Bateman M.D., M.P.H.  Roberta Roman M.D.  Alexandria Sanz, Ph.D., CCC-SLP  Juhi Pickens M.M. (voice), M.A., CCC-SLP  Jj Cordova M.M. (voice), M.A., CCC-SLP  RADHA Barrett (voice), M.S., CCC-SLP    [facility]  VOICE/SPEECH/BREATHING EVALUATION AND LARYNGEAL EXAMINATION REPORT    Patient: Casi Villatoro  Date of Visit: 6/17/2021    Clinician: Alexandria Sanz, Ph.D., CCC/SLP  Seen in conjunction with: Dr. Roberta Roman    CHIEF COMPLAINT:  Throat-clearing    HISTORY  Casi Villatoro is a 41 year old presenting today for evaluation of throat-clearing and occasional dyspnea.    Please refer to Dr. Roman s dictation for a more complete history and impressions.   Salient details of his history are as follows:    8-year history of throat clearing and episodes of shortness of breath    Worse when he had Covid    Shortness of breath episodes can be nocturnal  as well as throughout the day though the nocturnal episodes are rare (none since April)    Feels the shortness of breath comes on because his throat is dry    Primary complaint is throat clearing    Sometimes productive, then very thin secretions    Often the throat clear is nonproductive and dry    Throat clearing does seem to be related to voice use; voice use is not loud    Throat clearing also Nexium, and that he can't sleep    DIAGNOSIS/REASON FOR REFERRAL  Throat clearing/ Evaluate, perform laryngeal exam, treat as appropriate    CURRENT SYMPTOMS INCLUDE:  VOICE    No complaints, but voice use does seem to increase throat clearing  THROAT CLEARING    His primary complaint, sometimes productive    Feels this happens because of his dry throat  SWALLOWING    No complaint  SHORTNESS OF BREATH    Episodes that seem to be related to sensation of dryness of throat    Occasionally nocturnal    Denies significant dyspnea, dysphagia, or pain    OTHER PERTINENT HISTORY    Unknown; please refer to Dr. Roman's note    OBJECTIVE FINDINGS  VOICE/ SPEECH/ NON-COMMUNICATIVE LARYNGEAL BEHAVIORS EVALUATION  An evaluation of the voice and throat clearing was accomplished today; salient features are as follows:    Palpation of the laryngeal area shows:    No tenderness of the thyrohyoid area     Palpation does not elicit the sensation of needing to throat clear    Cough/ Throat clear:    Occasional    Dry, but mildly rough sounding    Locus of cough/ throat clear: sounds consistent with upper airway    Marked in severity; the throat clear is very strong    Breathing pattern:    Appears within normal limits and adequate; difficult to fully assess in this virtual visit    No overt tension is evident.    Voice quality is characterized by    Within normal limits; clear quality, not particularly depressed or narrow    Habitual pitch is within normal limits, in the range from C3 to G3    Loudness is WNL and appropriate for the  setting    A singing task shows voice quality is improved in /consistent between speech and singing    In a pitch glide task to determine pitch range, he demonstrates within normal limits pitch range    LARYNGEAL EXAMINATION    Endoscopic laryngeal examination was performed by:  Dr. Roman  I provided the protocol of instructions for the patient.  Type of exam:   Flexible endoscopy with chip-tip technology and stroboscopy  This exam shows:    Laryngeal and Vocal Fold Mucosa    Essentially healthy laryngeal mucosa, but does appear somewhat dry    moderate presence of dry thickened secretions on the vocal folds and throughout the laryngeal area    Status of vocal fold mucosa:   o Within normal limits, with no lesions and straight vibratory margins    Neurological and Functional Integrity of the Larynx    Vocal folds are mobile and meet at midline; movement is brisk and symmetric; exam is neurologically normal     Airway is WNL    There is some evidence of paradoxical motion of the vocal folds during today's examination    Some incoordination of vocal fold movement is evident during a task of 20 quickly repeated vowels    Elongation of the vocal folds for pitch increase is within normal limits    On phonation, glottic closure is within normal limits; complete    Demonstration of throat clear shows that the throat clear is highly compatible to the glottis and to the supraglottic structures, especially the epiglottis    Supraglottic Function and Therapy Probes    Moderate to marked anterior-posterior constriction of the supraglottic larynx during connected speech    Mild ventricular approximation during phonation;     Hyperadduction of the posterior glottis during phonation    Supraglottic function during singing is the same as in speech.    Stroboscopy was not warranted.    Dr. Roman and I reviewed this laryngeal exam with Mr. Villatoro today, and I provided pertinent explanations:    Endoscopic findings are consistent with  audio-perceptual assessment and patient Hx/complaints.    his symptoms are accounted for by the high impact to his laryngeal mucosa during throat clearing, which can lead to sensation of dryness and irritation; the brief moments of paradoxical vocal fold motion seem to be due to incoordination, and can likely be remediated functionally; there is also marked supraglottic hyperfunction, that may also result in irritation and sense of dryness    Because there appears to be a functional component to his symptoms, he would most likely benefit from functional speech therapy.    ASSESSMENT / PLAN  IMPRESSIONS:  This evaluation has resulted in the following diagnosis/diagnoses for Mr. Villatoro  Chronic Throat Clearing (R68.89)  Shortness Of Breath (R06.02)  Dysphonia (R49.0).      Laryngeal evaluation demonstrated high impact throat clearing, and marked supraglottic hyperfunction, with some evidence of paradoxical vocal fold movement    Perceptual evaluation demonstrated within normal limits voice quality, but very strong throat clear  RECOMMENDATIONS:     A course of speech therapy is recommended to optimize vocal technique and help reduce throat clear, mucosal irritation and episodes of shortness of breath.    He demonstrates a Good prognosis for improvement given adherence to therapeutic recommendations. Therapeutic     Positive indicators: commitment to process; diagnosis is known to respond to functional treatment;     Negative indicators: none, although from visits with  can be somewhat limiting.    TREATMENT PLAN  Speech therapy    DURATION/FREQUENCY OF TREATMENT  Six weekly or bi-weekly one-hour sessions, with three monthly one-hour follow-up sessions    GOALS  Patient goal:    To understand the problem and fix it as much as possible  To reduce his throat-clearing to acceptable levels  To breathe normally and comfortably in all situations, with no reports of episodes of shortness of breath    Long-term  goal(s): In 8 months, Mr. Villatoro will:  1.  Report a week with no more than two episodes of throat-clearing per day, that do not last more than two seconds  2.  Report a week with no episodes of shortness of breath, and no sensation of dryness of the throat.    This treatment plan was developed with the patient who agreed with the recommendations.    TOTAL SERVICE TIME: 30 minutes  EVALUATION OF VOICE AND RESONANCE (06114)  NO CHARGE FACILITY FEE       Alexandria Sanz, Ph.D., [facility]-SLP  Speech-Language Pathologist  Director, [facility]  [phone]

## 2021-06-17 NOTE — PATIENT INSTRUCTIONS
1.  You were seen in the ENT Clinic today by . If you have any questions or concerns after your appointment, please call [phone]. Press option #1 for scheduling related needs. Press option #3 for Nurse advice.    2.   has recommended  the following:   - voice therapy    3.  Plan is to return to clinic as needed or sooner if new or worsening symptoms develop.      Xenia Pratt LPN  [phone]  [facility] Otolaryngology

## 2021-07-29 ENCOUNTER — VIRTUAL VISIT (OUTPATIENT)
Dept: OTOLARYNGOLOGY | Facility: CLINIC | Age: 41
End: 2021-07-29
Payer: COMMERCIAL

## 2021-07-29 DIAGNOSIS — R49.0 DYSPHONIA: ICD-10-CM

## 2021-07-29 DIAGNOSIS — R09.89 CHRONIC THROAT CLEARING: Primary | ICD-10-CM

## 2021-07-29 DIAGNOSIS — R06.02 SHORTNESS OF BREATH: ICD-10-CM

## 2021-07-29 PROCEDURE — 92507 TX SP LANG VOICE COMM INDIV: CPT | Mod: GN | Performed by: SPEECH-LANGUAGE PATHOLOGIST

## 2021-07-29 NOTE — LETTER
7/29/2021       RE: Casi Villatoro  [address]  [place] [address]     Dear Colleague,    Thank you for referring your patient, Casi Villatoro, to the [facility] VOICE CLINIC [place] at [facility]. Please see a copy of my visit note below.    Casi Villatoro is a 41 year old male who is being evaluated via a billable video visit.      Casi has been notified and verbally consented to the following:     This video visit will be conducted between you and your provider.    Patient has opted to conduct today's video visit vs an in-person appointment.     Video visits are billed at different rates depending on your insurance coverage. Please reach out to your insurance provider with any questions.     If during the course of the call the provider feels the appointment is not appropriate, you will not be charged for this service.  Provider has received verbal consent for billable virtual visit from the patient? Yes  Will anyone else be joining your video visit?  - Oromo    Call initiated at: 10:30 AM   Type of Visit Platform Used: [employer] Phone  Location of provider: Home  Location of patient: [facility] VOICE [facility]  Robert Lozano Jr., M.D., F.A.C.S.  Modesta Bateman M.D., M.P.H.  Roberta Roman M.D.  Alexandria Sanz, Ph.D., CCC-SLP  Kingsley Ambrose, Ph.D., CCC-SLP  Juhi Pickens M.M. (voice), M.A., CCC-SLP  Jj Cordova M.M. (voice), M.A., CCC-SLP  RADHA Barrett (voice), M.S., CCC-SLP    [facility]  VOICE/SPEECH/BREATHING THERAPY PROGRESS REPORT    Patient: Casi Villatoro  Date of Service: 7/29/2021    Date of Last Service: 6/17/21  Referring physician: Dr. Roman  Initial evaluation: 6/17/21    I had the pleasure of seeing Mr. Villatoro today, for speech therapy to address a diagnosis of:  Chronic Throat Clearing (R68.89)   Shortness Of Breath (R06.02)   Dysphonia (R49.0).    PROGRESS SINCE LAST SESSION  Mr. Villatoro was seen for  evaluation on 6/17/21.  At that time, it was determined that he would benefit from a course of speech therapy to address the above diagnosis.      Mr. Villatoro states that:    His throat-clearing is reduced; he thinks because his URI is resolving  o He has been trying to reduce the force of his throat-clearing, per our previoius discussion    His breathing is also better  o He has not had any breathing episodes for the past month, and thinks the problem is now resolved    His voice is now normal  o He is happy that we can talk throughout this visit without any voice problems       Mr. Villatoro presents today with the following:    No breathing problems are apparent  Voice quality:    Can't tell because poor audio signal  Cough/ Throat clear:    None observed    THERAPEUTIC ACTIVITIES  Today Mr. Villatoro participated in the following therapeutic activities:    I provided instruction for techniques and strategies to reduce the chronic throat clearing  o alternative behaviors such as sipping and a hard swallow were taught  o He practiced the hard swallow technique as best we could, given the phone appointment  o I provided rationale for continued use of some of these strategies, until all symptoms are resolved    We reviewed all his goals and strategies, and agreed that he is ready to be discharged    IMPRESSIONS/GOALS/PLAN  Mr. Villatoro had a productive session of speech therapy today, to address the following:  Chronic Throat Clearing (R68.89)   Shortness Of Breath (R06.02)   Dysphonia (R49.0)  Speech therapy for him is medically necessary to allow  him to meet personal and professional demands and fully engage in activities of daily living.     He will continue to work on the strategies and techniques he has learned, and work on incorporating the techniques into his daily activities.    PLAN  Mr. Villatoro will continue to work on his own.    DISCHARGE SUMMARY  Casi Villatoro has had an initial evaluation on  6/17/21 and one subsequent  therapy session. The evaluation showed  Throat-clearing, shortness of breath, and dysphonia, and therefore a course of functional speech therapy was deemed medically necessary. Mr. Villatoro has met his goals of improvement in all areas, and he now feels able to continue practicing on his own. Therefore, he is discharged with the goals listed below.    GOALS  In 3 months, Mr. Villatoro will:  1.  Report a week of typical vocal activities, in which dysphonia does not exceed a level of 2 out of 10, 80% of the time   2.  Report a week of typical activities, in which shortness of breath does not exceed a level of 2 out of 10, 80% of the time  3.  Report a week with no more than two episodes of throat-clearing per day, that do not last more than two seconds    TOTAL SERVICE TIME: 34 minutes  TREATMENT (68743)  NO CHARGE FACILITY FEE    Alexandria Sanz, Ph.D., [facility]-SLP  Speech-Language Pathologist  Director, [facility]  [phone]

## 2021-07-29 NOTE — PROGRESS NOTES
Casi Villatoro is a 41 year old male who is being evaluated via a billable video visit.      Casi has been notified and verbally consented to the following:     This video visit will be conducted between you and your provider.    Patient has opted to conduct today's video visit vs an in-person appointment.     Video visits are billed at different rates depending on your insurance coverage. Please reach out to your insurance provider with any questions.     If during the course of the call the provider feels the appointment is not appropriate, you will not be charged for this service.  Provider has received verbal consent for billable virtual visit from the patient? Yes  Will anyone else be joining your video visit?  - Oromo    Call initiated at: 10:30 AM   Type of Visit Platform Used: [employer] Phone  Location of provider: Home  Location of patient: [facility] VOICE [facility]  Robert Lozano Jr., M.D., F.A.C.S.  Modesta Bateman M.D., M.P.H.  Roberta Roman M.D.  Alexandria Sanz, Ph.D., CCC-SLP  Kingsley Ambrose, Ph.D., [facility]-SLP  Juhi Pickens M.M. (voice), M.A., CCC-SLP  Jj Cordova M.M. (voice), M.A., CCC-SLP  RADHA Barrett (voice), M.S., CCC-SLP    [facility]  VOICE/SPEECH/BREATHING THERAPY PROGRESS REPORT    Patient: Casi Villatoro  Date of Service: 7/29/2021    Date of Last Service: 6/17/21  Referring physician: Dr. Roman  Initial evaluation: 6/17/21    I had the pleasure of seeing Mr. Villatoro today, for speech therapy to address a diagnosis of:  Chronic Throat Clearing (R68.89)   Shortness Of Breath (R06.02)   Dysphonia (R49.0).    PROGRESS SINCE LAST SESSION  Mr. Villatoro was seen for evaluation on 6/17/21.  At that time, it was determined that he would benefit from a course of speech therapy to address the above diagnosis.      Mr. Villatoro states that:    His throat-clearing is reduced; he thinks because his URI is resolving  o He has been trying to reduce the force of his throat-clearing, per our  previoius discussion    His breathing is also better  o He has not had any breathing episodes for the past month, and thinks the problem is now resolved    His voice is now normal  o He is happy that we can talk throughout this visit without any voice problems       Mr. Villatoro presents today with the following:    No breathing problems are apparent  Voice quality:    Can't tell because poor audio signal  Cough/ Throat clear:    None observed    THERAPEUTIC ACTIVITIES  Today Mr. Villatoro participated in the following therapeutic activities:    I provided instruction for techniques and strategies to reduce the chronic throat clearing  o alternative behaviors such as sipping and a hard swallow were taught  o He practiced the hard swallow technique as best we could, given the phone appointment  o I provided rationale for continued use of some of these strategies, until all symptoms are resolved    We reviewed all his goals and strategies, and agreed that he is ready to be discharged    IMPRESSIONS/GOALS/PLAN  Mr. Villatoro had a productive session of speech therapy today, to address the following:  Chronic Throat Clearing (R68.89)   Shortness Of Breath (R06.02)   Dysphonia (R49.0)  Speech therapy for him is medically necessary to allow  him to meet personal and professional demands and fully engage in activities of daily living.     He will continue to work on the strategies and techniques he has learned, and work on incorporating the techniques into his daily activities.    PLAN  Mr. Villatoro will continue to work on his own.    DISCHARGE SUMMARY  Casi Villatoro has had an initial evaluation on  6/17/21 and one subsequent therapy session. The evaluation showed  Throat-clearing, shortness of breath, and dysphonia, and therefore a course of functional speech therapy was deemed medically necessary. Mr. Villatoro has met his goals of improvement in all areas, and he now feels able to continue practicing on his own. Therefore, he is discharged  with the goals listed below.    GOALS  In 3 months, Mr. Villatoro will:  1.  Report a week of typical vocal activities, in which dysphonia does not exceed a level of 2 out of 10, 80% of the time   2.  Report a week of typical activities, in which shortness of breath does not exceed a level of 2 out of 10, 80% of the time  3.  Report a week with no more than two episodes of throat-clearing per day, that do not last more than two seconds    TOTAL SERVICE TIME: 34 minutes  TREATMENT (60228)  NO CHARGE FACILITY FEE    Alexandria Sanz, Ph.D., [facility]-SLP  Speech-Language Pathologist  Director, [facility]  [phone]

## 2021-08-03 NOTE — PROGRESS NOTES
Assessment & Plan     Type 2 diabetes mellitus without complication, with long-term current use of insulin (H)  Improving. Continue with lifestyle modifications.   - CBC with platelets and differential; Future  - Comprehensive metabolic panel (BMP + Alb, Alk Phos, ALT, AST, Total. Bili, TP); Future  - Lipid panel reflex to direct LDL Non-fasting; Future  - Hemoglobin A1c; Future    High triglycerides  - Lipid panel reflex to direct LDL Fasting; Future    18 minutes spent on the date of the encounter doing chart review, history and exam, documentation and further activities per the note       Patient Instructions   - Continue to cut back on simple carbohydrates such as bread, rice, pasta, potatoes, exercise regularly.   - Call clinic with any questions or concerns.         Return for Follow-up with PCP as recommended..    NIKKO Pantoja CNP [facility]    Addie Lance is a 41 year old who presents for the following health issues  accompanied by phone :    HPI     Diabetes Follow-up  Concerned about his kidneys and his heart. Denies any new symptoms. Was diagnosed with diabetes in 2019. Notes that he is trying to do some lifestyle changes: eating healthier and also walking more frequently.   How often are you checking your blood sugar? Three times daily; Fasting blood sugars .   What time of day are you checking your blood sugars (select all that apply)?  Before and after meals and At bedtime  Have you had any blood sugars above 200?  Yes never above 200 but sometimes 200   Have you had any blood sugars below 70?  No    What symptoms do you notice when your blood sugar is low?  None    What concerns do you have today about your diabetes? None     Do you have any of these symptoms? (Select all that apply)  No numbness or tingling in feet.  No redness, sores or blisters on feet.  No complaints of excessive thirst.  No reports of blurry vision.  No  "significant changes to weight.    Have you had a diabetic eye exam in the last 12 months? Yes     BP Readings from Last 2 Encounters:   08/19/21 124/72   04/19/21 118/82     Hemoglobin A1C (%)   Date Value   04/19/2021 7.7 (H)   09/21/2020 7.3 (H)     LDL Cholesterol Calculated (mg/dL)   Date Value   10/14/2020 52   06/27/2019     Cannot estimate LDL when triglyceride exceeds 400 mg/dL     LDL Cholesterol Direct (mg/dL)   Date Value   06/27/2019 53                 How many servings of fruits and vegetables do you eat daily?  2-3    On average, how many sweetened beverages do you drink each day (Examples: soda, juice, sweet tea, etc.  Do NOT count diet or artificially sweetened beverages)?   0    How many days per week do you exercise enough to make your heart beat faster? 7    How many minutes a day do you exercise enough to make your heart beat faster? 60 or more    How many days per week do you miss taking your medication? 0    Review of Systems   Constitutional, HEENT, cardiovascular, pulmonary, gi and gu systems are negative, except as otherwise noted.      Objective    /72   Pulse 76   Temp 97.5  F (36.4  C) (Temporal)   Resp 16   Ht 1.651 m (5' 5\")   Wt 85.3 kg (188 lb)   SpO2 97%   BMI 31.28 kg/m    Body mass index is 31.28 kg/m .  Physical Exam   GENERAL: healthy, alert and no distress  EYES: Eyes grossly normal to inspection, PERRL and conjunctivae and sclerae normal  NECK: no adenopathy, no asymmetry, masses, or scars and thyroid normal to palpation  RESP: lungs clear to auscultation - no rales, rhonchi or wheezes  CV: regular rate and rhythm, normal S1 S2, no S3 or S4, no murmur, click or rub, no peripheral edema and peripheral pulses strong  ABDOMEN: soft, nontender and bowel sounds normal  MS: no gross musculoskeletal defects noted, no edema  NEURO: Normal strength and tone, mentation intact and speech normal    Results for orders placed or performed in visit on 08/19/21   TSH with free T4 " reflex     Status: Normal   Result Value Ref Range    TSH 1.12 0.40 - 4.00 mU/L   Thyroid peroxidase antibody     Status: Normal   Result Value Ref Range    Thyroid Peroxidase Antibody <10 <35 IU/mL   CBC with platelets and differential     Status: Abnormal    Narrative    The following orders were created for panel order CBC with platelets and differential.  Procedure                               Abnormality         Status                     ---------                               -----------         ------                     CBC with platelets and d...[[id number]]  Abnormal            Final result                 Please view results for these tests on the individual orders.   Comprehensive metabolic panel (BMP + Alb, Alk Phos, ALT, AST, Total. Bili, TP)     Status: Abnormal   Result Value Ref Range    Sodium 136 133 - 144 mmol/L    Potassium 3.8 3.4 - 5.3 mmol/L    Chloride 104 94 - 109 mmol/L    Carbon Dioxide (CO2) 28 20 - 32 mmol/L    Anion Gap 4 3 - 14 mmol/L    Urea Nitrogen 15 7 - 30 mg/dL    Creatinine 0.80 0.66 - 1.25 mg/dL    Calcium 8.8 8.5 - 10.1 mg/dL    Glucose 268 (H) 70 - 99 mg/dL    Alkaline Phosphatase 67 40 - 150 U/L    AST 27 0 - 45 U/L    ALT 53 0 - 70 U/L    Protein Total 7.6 6.8 - 8.8 g/dL    Albumin 3.8 3.4 - 5.0 g/dL    Bilirubin Total 0.5 0.2 - 1.3 mg/dL    GFR Estimate >90 >60 mL/min/1.73m2   Lipid panel reflex to direct LDL Non-fasting     Status: Abnormal   Result Value Ref Range    Cholesterol 163 <200 mg/dL    Triglycerides 735 (H) <150 mg/dL    Direct Measure HDL 27 (L) >=40 mg/dL    LDL Cholesterol Calculated      Non HDL Cholesterol 136 (H) <130 mg/dL    Patient Fasting > 8hrs? No    Hemoglobin A1c     Status: Abnormal   Result Value Ref Range    Hemoglobin A1C 6.8 (H) 0.0 - 5.6 %   CBC with platelets and differential     Status: Abnormal   Result Value Ref Range    WBC Count 4.8 4.0 - 11.0 10e3/uL    RBC Count 5.11 4.40 - 5.90 10e6/uL    Hemoglobin 15.2 13.3 - 17.7 g/dL     Hematocrit 42.5 40.0 - 53.0 %    MCV 83 78 - 100 fL    MCH 29.7 26.5 - 33.0 pg    MCHC 35.8 31.5 - 36.5 g/dL    RDW 14.1 10.0 - 15.0 %    Platelet Count 105 (L) 150 - 450 10e3/uL    % Neutrophils 51 %    % Lymphocytes 34 %    % Monocytes 8 %    % Eosinophils 7 %    % Basophils 0 %    Absolute Neutrophils 2.4 1.6 - 8.3 10e3/uL    Absolute Lymphocytes 1.6 0.8 - 5.3 10e3/uL    Absolute Monocytes 0.4 0.0 - 1.3 10e3/uL    Absolute Eosinophils 0.3 0.0 - 0.7 10e3/uL    Absolute Basophils 0.0 0.0 - 0.2 10e3/uL   LDL cholesterol direct     Status: Normal   Result Value Ref Range    LDL Cholesterol Direct 69 <100 mg/dL

## 2021-08-19 ENCOUNTER — LAB (OUTPATIENT)
Dept: LAB | Facility: CLINIC | Age: 41
End: 2021-08-19
Payer: COMMERCIAL

## 2021-08-19 ENCOUNTER — OFFICE VISIT (OUTPATIENT)
Dept: FAMILY MEDICINE | Facility: CLINIC | Age: 41
End: 2021-08-19
Payer: COMMERCIAL

## 2021-08-19 VITALS
BODY MASS INDEX: 31.32 KG/M2 | HEART RATE: 76 BPM | TEMPERATURE: 97.5 F | SYSTOLIC BLOOD PRESSURE: 124 MMHG | WEIGHT: 188 LBS | DIASTOLIC BLOOD PRESSURE: 72 MMHG | RESPIRATION RATE: 16 BRPM | HEIGHT: 65 IN | OXYGEN SATURATION: 97 %

## 2021-08-19 DIAGNOSIS — E04.9 GOITER: ICD-10-CM

## 2021-08-19 DIAGNOSIS — E11.9 TYPE 2 DIABETES MELLITUS WITHOUT COMPLICATION, WITH LONG-TERM CURRENT USE OF INSULIN (H): Primary | ICD-10-CM

## 2021-08-19 DIAGNOSIS — E78.1 HIGH TRIGLYCERIDES: ICD-10-CM

## 2021-08-19 DIAGNOSIS — Z79.4 TYPE 2 DIABETES MELLITUS WITHOUT COMPLICATION, WITH LONG-TERM CURRENT USE OF INSULIN (H): ICD-10-CM

## 2021-08-19 DIAGNOSIS — E11.9 TYPE 2 DIABETES MELLITUS WITHOUT COMPLICATION, WITH LONG-TERM CURRENT USE OF INSULIN (H): ICD-10-CM

## 2021-08-19 DIAGNOSIS — Z79.4 TYPE 2 DIABETES MELLITUS WITHOUT COMPLICATION, WITH LONG-TERM CURRENT USE OF INSULIN (H): Primary | ICD-10-CM

## 2021-08-19 LAB
BASOPHILS # BLD AUTO: 0 10E3/UL (ref 0–0.2)
BASOPHILS NFR BLD AUTO: 0 %
EOSINOPHIL # BLD AUTO: 0.3 10E3/UL (ref 0–0.7)
EOSINOPHIL NFR BLD AUTO: 7 %
ERYTHROCYTE [DISTWIDTH] IN BLOOD BY AUTOMATED COUNT: 14.1 % (ref 10–15)
HBA1C MFR BLD: 6.8 % (ref 0–5.6)
HCT VFR BLD AUTO: 42.5 % (ref 40–53)
HGB BLD-MCNC: 15.2 G/DL (ref 13.3–17.7)
LYMPHOCYTES # BLD AUTO: 1.6 10E3/UL (ref 0.8–5.3)
LYMPHOCYTES NFR BLD AUTO: 34 %
MCH RBC QN AUTO: 29.7 PG (ref 26.5–33)
MCHC RBC AUTO-ENTMCNC: 35.8 G/DL (ref 31.5–36.5)
MCV RBC AUTO: 83 FL (ref 78–100)
MONOCYTES # BLD AUTO: 0.4 10E3/UL (ref 0–1.3)
MONOCYTES NFR BLD AUTO: 8 %
NEUTROPHILS # BLD AUTO: 2.4 10E3/UL (ref 1.6–8.3)
NEUTROPHILS NFR BLD AUTO: 51 %
PLATELET # BLD AUTO: 105 10E3/UL (ref 150–450)
RBC # BLD AUTO: 5.11 10E6/UL (ref 4.4–5.9)
WBC # BLD AUTO: 4.8 10E3/UL (ref 4–11)

## 2021-08-19 PROCEDURE — 86376 MICROSOMAL ANTIBODY EACH: CPT

## 2021-08-19 PROCEDURE — 80061 LIPID PANEL: CPT

## 2021-08-19 PROCEDURE — 80050 GENERAL HEALTH PANEL: CPT

## 2021-08-19 PROCEDURE — 99214 OFFICE O/P EST MOD 30 MIN: CPT | Performed by: NURSE PRACTITIONER

## 2021-08-19 PROCEDURE — 36415 COLL VENOUS BLD VENIPUNCTURE: CPT

## 2021-08-19 PROCEDURE — 83721 ASSAY OF BLOOD LIPOPROTEIN: CPT | Mod: 59

## 2021-08-19 PROCEDURE — 83036 HEMOGLOBIN GLYCOSYLATED A1C: CPT

## 2021-08-19 ASSESSMENT — MIFFLIN-ST. JEOR: SCORE: 1689.64

## 2021-08-19 NOTE — Clinical Note
He is doing good overall, A1C improved.  Trigs elevated, he was going to come in for a fasting lipid check.   Jovanni

## 2021-08-19 NOTE — PATIENT INSTRUCTIONS
- Continue to cut back on simple carbohydrates such as bread, rice, pasta, potatoes, exercise regularly.   - Call clinic with any questions or concerns.

## 2021-08-19 NOTE — LETTER
August 25, 2021      Casi Villatoro  [address]  [address]  [place] [address]        Dear ,    We are writing to inform you of your test results.    Your test results fall within the expected range(s) or remain unchanged from previous results.  Please continue with current treatment plan.    Resulted Orders   TSH with free T4 reflex   Result Value Ref Range    TSH 1.12 0.40 - 4.00 mU/L   Thyroid peroxidase antibody   Result Value Ref Range    Thyroid Peroxidase Antibody <10 <35 IU/mL       If you have any questions or concerns, please call the clinic at the number listed above.       Sincerely,      Rafa Dhillon MD

## 2021-08-20 ENCOUNTER — TELEPHONE (OUTPATIENT)
Dept: FAMILY MEDICINE | Facility: CLINIC | Age: 41
End: 2021-08-20

## 2021-08-20 LAB
ALBUMIN SERPL-MCNC: 3.8 G/DL (ref 3.4–5)
ALP SERPL-CCNC: 67 U/L (ref 40–150)
ALT SERPL W P-5'-P-CCNC: 53 U/L (ref 0–70)
ANION GAP SERPL CALCULATED.3IONS-SCNC: 4 MMOL/L (ref 3–14)
AST SERPL W P-5'-P-CCNC: 27 U/L (ref 0–45)
BILIRUB SERPL-MCNC: 0.5 MG/DL (ref 0.2–1.3)
BUN SERPL-MCNC: 15 MG/DL (ref 7–30)
CALCIUM SERPL-MCNC: 8.8 MG/DL (ref 8.5–10.1)
CHLORIDE BLD-SCNC: 104 MMOL/L (ref 94–109)
CHOLEST SERPL-MCNC: 163 MG/DL
CO2 SERPL-SCNC: 28 MMOL/L (ref 20–32)
CREAT SERPL-MCNC: 0.8 MG/DL (ref 0.66–1.25)
FASTING STATUS PATIENT QL REPORTED: NO
GFR SERPL CREATININE-BSD FRML MDRD: >90 ML/MIN/1.73M2
GLUCOSE BLD-MCNC: 268 MG/DL (ref 70–99)
HDLC SERPL-MCNC: 27 MG/DL
LDLC SERPL CALC-MCNC: 69 MG/DL
LDLC SERPL CALC-MCNC: ABNORMAL MG/DL
NONHDLC SERPL-MCNC: 136 MG/DL
POTASSIUM BLD-SCNC: 3.8 MMOL/L (ref 3.4–5.3)
PROT SERPL-MCNC: 7.6 G/DL (ref 6.8–8.8)
SODIUM SERPL-SCNC: 136 MMOL/L (ref 133–144)
THYROPEROXIDASE AB SERPL-ACNC: <10 IU/ML
TRIGL SERPL-MCNC: 735 MG/DL
TSH SERPL DL<=0.005 MIU/L-ACNC: 1.12 MU/L (ref 0.4–4)

## 2021-08-20 NOTE — TELEPHONE ENCOUNTER
Left message for patient to call [facility] back  When patient calls back please transfer to RN  Adilia STOKES, LEXIS De Souza, Camilla N, APRN CNP  P [facility] All Nurse Pool  Mr. Villatoro,     Please call and have patient come in for a fasting lipid panel. Trig were high.     Your HDL (good cholesterol) was below my goal for you (>45 in men and > 55 in women).  Genetics and inactivity can contribute to this. Your triglycerides were above normal.  Poor diet, genetics and being overweight can contribute to this.       Kidneys are ruben.     Please contact the clinic if you have additional questions.  Thank you.     Sincerely,     NIKKO Pantoja CNP, Lydia N, APRN CNP P [facility] All Nurse Pool  Team - please call patient with results.     A1C improving, Keep up the good work! I suggest that you continue with the changes we suggested at your appointment. You should recheck your A1c in 3 months.   Your blood count is normal.  There is no anemia or abnormalities suggesting infection or other problems.     Please contact the clinic if you have additional questions.  Thank you.     Sincerely,     NIKKO Pantoja CNP

## 2021-08-23 ENCOUNTER — LAB (OUTPATIENT)
Dept: LAB | Facility: CLINIC | Age: 41
End: 2021-08-23
Payer: COMMERCIAL

## 2021-08-23 DIAGNOSIS — E78.1 HIGH TRIGLYCERIDES: ICD-10-CM

## 2021-08-23 LAB
CHOLEST SERPL-MCNC: 123 MG/DL
FASTING STATUS PATIENT QL REPORTED: YES
HDLC SERPL-MCNC: 24 MG/DL
LDLC SERPL CALC-MCNC: 55 MG/DL
LDLC SERPL CALC-MCNC: ABNORMAL MG/DL
NONHDLC SERPL-MCNC: 99 MG/DL
TRIGL SERPL-MCNC: 636 MG/DL

## 2021-08-23 PROCEDURE — 80061 LIPID PANEL: CPT

## 2021-08-23 PROCEDURE — 36415 COLL VENOUS BLD VENIPUNCTURE: CPT

## 2021-08-24 NOTE — RESULT ENCOUNTER NOTE
Please notify patient: Thyroid tests are normal; contact if questions.     Thanks Rafa Dhillon MD

## 2021-08-30 ENCOUNTER — APPOINTMENT (OUTPATIENT)
Dept: INTERPRETER SERVICES | Facility: CLINIC | Age: 41
End: 2021-08-30
Payer: COMMERCIAL

## 2021-08-30 ENCOUNTER — TELEPHONE (OUTPATIENT)
Dept: FAMILY MEDICINE | Facility: CLINIC | Age: 41
End: 2021-08-30

## 2021-08-30 DIAGNOSIS — E11.9 TYPE 2 DIABETES MELLITUS WITHOUT COMPLICATION, UNSPECIFIED WHETHER LONG TERM INSULIN USE (H): ICD-10-CM

## 2021-08-30 NOTE — TELEPHONE ENCOUNTER
Patient called via  and read results to patient along with education given on low fat diet. Patient verbalized understanding.      Karen Bauman RN  [facility]

## 2021-08-30 NOTE — TELEPHONE ENCOUNTER
"Requested Prescriptions   Signed Prescriptions Disp Refills    blood glucose monitoring (ACCU-CHEK MULTICLIX) lancets 200 each 3     Sig: Use to test blood sugar 4 times daily or as directed.       Diabetic Supplies Protocol Passed - 8/30/2021  1:54 PM        Passed - Medication is active on med list        Passed - Patient is 18 years of age or older        Passed - Recent (6 mo) or future (30 days) visit within the authorizing provider's specialty     Patient had office visit in the last 6 months or has a visit in the next 30 days with authorizing provider.  See \"Patient Info\" tab in inbasket, or \"Choose Columns\" in Meds & Orders section of the refill encounter.               Thanks,  LEXIS Jones  [facility]     "

## 2021-09-09 DIAGNOSIS — E11.9 TYPE 2 DIABETES MELLITUS WITHOUT COMPLICATION, UNSPECIFIED WHETHER LONG TERM INSULIN USE (H): ICD-10-CM

## 2021-09-13 NOTE — TELEPHONE ENCOUNTER
"Requested Prescriptions   Signed Prescriptions Disp Refills    insulin glargine (LANTUS SOLOSTAR) 100 UNIT/ML pen 1 mL 1     Sig: INJECT 42 UNITS UNDER THE SKIN ONCE DAILY       Long Acting Insulin Protocol Passed - 9/9/2021  4:43 PM        Passed - Serum creatinine on file in past 12 months     Recent Labs   Lab Test 08/19/21  1208   CR 0.80       Ok to refill medication if creatinine is low          Passed - HgbA1C in past 3 or 6 months     If HgbA1C is 8 or greater, it needs to be on file within the past 3 months.  If less than 8, must be on file within the past 6 months.     Recent Labs   Lab Test 08/19/21  1208   A1C 6.8*             Passed - Medication is active on med list        Passed - Patient is age 18 or older        Passed - Recent (6 mo) or future (30 days) visit within the authorizing provider's specialty     Patient had office visit in the last 6 months or has a visit in the next 30 days with authorizing provider or within the authorizing provider's specialty.  See \"Patient Info\" tab in inbasket, or \"Choose Columns\" in Meds & Orders section of the refill encounter.               Thanks,  LEXIS Jones  [facility]     "

## 2021-10-23 ENCOUNTER — APPOINTMENT (OUTPATIENT)
Dept: CT IMAGING | Facility: CLINIC | Age: 41
End: 2021-10-23
Attending: EMERGENCY MEDICINE
Payer: COMMERCIAL

## 2021-10-23 ENCOUNTER — HOSPITAL ENCOUNTER (EMERGENCY)
Facility: CLINIC | Age: 41
Discharge: HOME | End: 2021-10-23
Attending: EMERGENCY MEDICINE | Admitting: EMERGENCY MEDICINE
Payer: COMMERCIAL

## 2021-10-23 VITALS
WEIGHT: 188 LBS | TEMPERATURE: 97.9 F | HEART RATE: 66 BPM | OXYGEN SATURATION: 95 % | SYSTOLIC BLOOD PRESSURE: 114 MMHG | BODY MASS INDEX: 30.22 KG/M2 | RESPIRATION RATE: 18 BRPM | DIASTOLIC BLOOD PRESSURE: 72 MMHG | HEIGHT: 66 IN

## 2021-10-23 DIAGNOSIS — G47.30 SLEEP APNEA, UNSPECIFIED TYPE: ICD-10-CM

## 2021-10-23 DIAGNOSIS — R09.89 CHRONIC THROAT CLEARING: ICD-10-CM

## 2021-10-23 LAB
ANION GAP SERPL CALCULATED.3IONS-SCNC: 3 MMOL/L (ref 3–14)
BASOPHILS # BLD AUTO: 0 10E3/UL (ref 0–0.2)
BASOPHILS NFR BLD AUTO: 1 %
BUN SERPL-MCNC: 16 MG/DL (ref 7–30)
CALCIUM SERPL-MCNC: 8.6 MG/DL (ref 8.5–10.1)
CHLORIDE BLD-SCNC: 102 MMOL/L (ref 94–109)
CO2 SERPL-SCNC: 31 MMOL/L (ref 20–32)
CREAT SERPL-MCNC: 0.83 MG/DL (ref 0.66–1.25)
EOSINOPHIL # BLD AUTO: 0.1 10E3/UL (ref 0–0.7)
EOSINOPHIL NFR BLD AUTO: 2 %
ERYTHROCYTE [DISTWIDTH] IN BLOOD BY AUTOMATED COUNT: 12.8 % (ref 10–15)
GFR SERPL CREATININE-BSD FRML MDRD: >90 ML/MIN/1.73M2
GLUCOSE BLD-MCNC: 147 MG/DL (ref 70–99)
HCT VFR BLD AUTO: 44.2 % (ref 40–53)
HGB BLD-MCNC: 15.2 G/DL (ref 13.3–17.7)
IMM GRANULOCYTES # BLD: 0 10E3/UL
IMM GRANULOCYTES NFR BLD: 0 %
LYMPHOCYTES # BLD AUTO: 1.9 10E3/UL (ref 0.8–5.3)
LYMPHOCYTES NFR BLD AUTO: 35 %
MCH RBC QN AUTO: 29.3 PG (ref 26.5–33)
MCHC RBC AUTO-ENTMCNC: 34.4 G/DL (ref 31.5–36.5)
MCV RBC AUTO: 85 FL (ref 78–100)
MONOCYTES # BLD AUTO: 0.5 10E3/UL (ref 0–1.3)
MONOCYTES NFR BLD AUTO: 9 %
NEUTROPHILS # BLD AUTO: 3 10E3/UL (ref 1.6–8.3)
NEUTROPHILS NFR BLD AUTO: 53 %
NRBC # BLD AUTO: 0 10E3/UL
NRBC BLD AUTO-RTO: 0 /100
PLATELET # BLD AUTO: 162 10E3/UL (ref 150–450)
POTASSIUM BLD-SCNC: 3.8 MMOL/L (ref 3.4–5.3)
RBC # BLD AUTO: 5.18 10E6/UL (ref 4.4–5.9)
SODIUM SERPL-SCNC: 136 MMOL/L (ref 133–144)
WBC # BLD AUTO: 5.5 10E3/UL (ref 4–11)

## 2021-10-23 PROCEDURE — 94640 AIRWAY INHALATION TREATMENT: CPT | Performed by: EMERGENCY MEDICINE

## 2021-10-23 PROCEDURE — 250N000009 HC RX 250: Performed by: EMERGENCY MEDICINE

## 2021-10-23 PROCEDURE — 80048 BASIC METABOLIC PNL TOTAL CA: CPT | Performed by: EMERGENCY MEDICINE

## 2021-10-23 PROCEDURE — 36415 COLL VENOUS BLD VENIPUNCTURE: CPT | Performed by: EMERGENCY MEDICINE

## 2021-10-23 PROCEDURE — 99284 EMERGENCY DEPT VISIT MOD MDM: CPT | Performed by: EMERGENCY MEDICINE

## 2021-10-23 PROCEDURE — 85025 COMPLETE CBC W/AUTO DIFF WBC: CPT | Performed by: EMERGENCY MEDICINE

## 2021-10-23 PROCEDURE — 250N000011 HC RX IP 250 OP 636: Performed by: EMERGENCY MEDICINE

## 2021-10-23 PROCEDURE — 99285 EMERGENCY DEPT VISIT HI MDM: CPT | Mod: 25 | Performed by: EMERGENCY MEDICINE

## 2021-10-23 PROCEDURE — 70491 CT SOFT TISSUE NECK W/DYE: CPT

## 2021-10-23 RX ORDER — IOPAMIDOL 755 MG/ML
100 INJECTION, SOLUTION INTRAVASCULAR ONCE
Status: COMPLETED | OUTPATIENT
Start: 2021-10-23 | End: 2021-10-23

## 2021-10-23 RX ORDER — IPRATROPIUM BROMIDE AND ALBUTEROL SULFATE 2.5; .5 MG/3ML; MG/3ML
3 SOLUTION RESPIRATORY (INHALATION) ONCE
Status: COMPLETED | OUTPATIENT
Start: 2021-10-23 | End: 2021-10-23

## 2021-10-23 RX ADMIN — SODIUM CHLORIDE 50 ML: 9 INJECTION, SOLUTION INTRAVENOUS at 06:04

## 2021-10-23 RX ADMIN — IPRATROPIUM BROMIDE AND ALBUTEROL SULFATE 3 ML: .5; 3 SOLUTION RESPIRATORY (INHALATION) at 04:30

## 2021-10-23 RX ADMIN — IOPAMIDOL 80 ML: 755 INJECTION, SOLUTION INTRAVENOUS at 05:58

## 2021-10-23 ASSESSMENT — ENCOUNTER SYMPTOMS
SHORTNESS OF BREATH: 1
BACK PAIN: 0
FREQUENCY: 0
NECK PAIN: 0
MYALGIAS: 0
DYSURIA: 0
CHEST TIGHTNESS: 0
ARTHRALGIAS: 0
CONSTIPATION: 0
TROUBLE SWALLOWING: 0
DIFFICULTY URINATING: 0
PALPITATIONS: 0
CHILLS: 0
EYE PAIN: 0
NAUSEA: 0
HEADACHES: 0
VOICE CHANGE: 1
COLOR CHANGE: 0
WEAKNESS: 0
ABDOMINAL DISTENTION: 0
FEVER: 0
FATIGUE: 0
DIZZINESS: 0
VOMITING: 0
DIARRHEA: 0
SORE THROAT: 0
ABDOMINAL PAIN: 0
CONFUSION: 0
COUGH: 0

## 2021-10-23 ASSESSMENT — MIFFLIN-ST. JEOR: SCORE: 1705.51

## 2021-10-23 NOTE — ED NOTES
Pt endorses sleep apnea episodes where he feels like something is stuck in his throat and having dyspnea.

## 2021-10-23 NOTE — DISCHARGE INSTRUCTIONS
Your work-up in the emergency department including labs, CT scan, did not show evidence of any infection or airway obstructing lesion.  Your oxygen levels have been normal.  Your symptoms are most likely consistent with sleep apnea and could also be related to a your previously diagnosed chronic throat clearing syndrome.  Make sure to follow-up with your ENT/speech therapist.  You should also follow-up with your primary care physician to discuss a sleep study and possible CPAP machine.  Return to the ED with any new or worsening symptoms.

## 2021-10-23 NOTE — LETTER
October 23, 2021      To Whom It May Concern:      Casi Villatoro was seen in our Emergency Department today, 10/23/21.  I expect his condition to improve over the next 1-2 days.  He may return to work/school when improved.    Sincerely,        Ben Dick, DO

## 2021-10-23 NOTE — ED PROVIDER NOTES
ED Provider Note  [facility]      History     Chief Complaint   Patient presents with     Shortness of Breath     sleep apnea     HPI  Casi Villatoro is a 41 year old male with history of type 2 diabetes.  Who presents to the ED for evaluation of foreign body sensation in his throat and shortness of breath.  Patient has had this issue intermittently for the past 8 years.  Follows closely with ENT service.  Recently underwent speech therapy sessions.  Has been diagnosed with laryngeal irritation as well as chronic throat clearing syndrome.  His work-up including ultrasound and laryngoscopy has been largely unremarkable.  Tonight, he states that he awoke from sleep with a feeling that he could not breathe.  He feels that something got stuck in his throat.  He states that he has this symptom on and off chronically for years but it was worse tonight.  Caused him to feel short of breath.  Denies any difficulty swallowing.  Feels that his voice sounds different.  Denies any recent illness, fever/chills, cough, sore throat, chest pain, nausea/vomiting, GERD/reflux, and has no other medical complaints.    Past Medical History  Past Medical History:   Diagnosis Date     BMI 31.0-31.9,adult 6/20/2013     Hyperlipidemia LDL goal <100 4/16/2013     Past Surgical History:   Procedure Laterality Date     NO HISTORY OF SURGERY  2013     blood glucose (NO BRAND SPECIFIED) test strip  blood glucose monitoring (ACCU-CHEK MULTICLIX) lancets  famotidine (PEPCID) 20 MG tablet  insulin aspart (NOVOLOG PEN) 100 UNIT/ML pen  insulin glargine (LANTUS SOLOSTAR) 100 UNIT/ML pen  insulin glargine (LANTUS SOLOSTAR) 100 UNIT/ML pen  insulin pen needle (32G X 4 MM) 32G X 4 MM miscellaneous      No Known Allergies  Family History  Family History   Problem Relation Age of Onset     Cancer - colorectal No family hx of      Prostate Cancer No family hx of      Social History   Social History     Tobacco Use     Smoking  "status: Never Smoker     Smokeless tobacco: Never Used   Substance Use Topics     Alcohol use: No     Drug use: No      Past medical history, past surgical history, medications, allergies, family history, and social history were reviewed with the patient. No additional pertinent items.       Review of Systems   Constitutional: Negative for chills, fatigue and fever.   HENT: Positive for voice change. Negative for congestion, sore throat and trouble swallowing.    Eyes: Negative for pain and visual disturbance.   Respiratory: Positive for shortness of breath. Negative for cough and chest tightness.    Cardiovascular: Negative for chest pain and palpitations.   Gastrointestinal: Negative for abdominal distention, abdominal pain, constipation, diarrhea, nausea and vomiting.   Genitourinary: Negative for difficulty urinating, dysuria, frequency and urgency.   Musculoskeletal: Negative for arthralgias, back pain, myalgias and neck pain.   Skin: Negative for color change and rash.   Neurological: Negative for dizziness, weakness and headaches.   Psychiatric/Behavioral: Negative for confusion.     A complete review of systems was performed with pertinent positives and negatives noted in the HPI, and all other systems negative.    Physical Exam   BP: (!) 119/90  Pulse: 82  Temp: 97.9  F (36.6  C)  Resp: 18  Height: 167.6 cm (5' 6\")  Weight: 85.3 kg (188 lb)  SpO2: 97 %  Physical Exam  Vitals and nursing note reviewed.   Constitutional:       General: He is not in acute distress.     Appearance: Normal appearance. He is not ill-appearing or toxic-appearing.   HENT:      Head: Normocephalic and atraumatic.      Nose: Nose normal.      Mouth/Throat:      Mouth: Mucous membranes are moist.      Pharynx: Oropharynx is clear. No oropharyngeal exudate or posterior oropharyngeal erythema.      Comments: Controlling secretions.  No tripoding.  Protecting airway.  Tongue and uvula midline.  Eyes:      Pupils: Pupils are equal, round, " and reactive to light.   Neck:      Comments: No masses palpated..  Thyroid midline  Cardiovascular:      Rate and Rhythm: Normal rate.      Pulses: Normal pulses.      Heart sounds: Normal heart sounds.   Pulmonary:      Effort: Pulmonary effort is normal. No respiratory distress.      Breath sounds: Normal breath sounds. No stridor. No wheezing or rales.      Comments: Muffled voice  Abdominal:      General: Abdomen is flat. There is no distension.   Musculoskeletal:         General: No swelling or deformity. Normal range of motion.      Cervical back: Normal range of motion and neck supple. No rigidity.   Skin:     General: Skin is warm.      Capillary Refill: Capillary refill takes less than 2 seconds.   Neurological:      Mental Status: He is alert and oriented to person, place, and time.   Psychiatric:         Mood and Affect: Mood normal.         ED Course      Procedures       The medical record was reviewed and interpreted.       Results for orders placed or performed during the hospital encounter of 10/23/21   Soft tissue neck CT w contrast     Status: None    Narrative    EXAM: CT SOFT TISSUE NECK W CONTRAST  LOCATION: [facility]  DATE/TIME: 10/23/2021 5:47 AM    INDICATION: r/o mass, epiglotitis, neck fullness, dysphonia  COMPARISON: None.  CONTRAST: 80mL's iso 370  TECHNIQUE: Routine CT Soft Tissue Neck with IV contrast. Multiplanar reformats. Dose reduction techniques were used.    FINDINGS:     MUCOSAL SPACES/SOFT TISSUES: Normal mucosal spaces of the upper aerodigestive tract. No mucosal mass or inflammation identified. Normal vocal cords and infraglottic trachea. Normal parapharyngeal space and subcutaneous soft tissues. Normal oral cavity,    spaces, and floor of mouth structures.    LYMPH NODES: No pathologic lymph nodes by size or morphology criteria.     SALIVARY GLANDS: Normal parotid and submandibular glands.    THYROID: Normal.      VESSELS: Vascular structures of the neck are patent.    VISUALIZED INTRACRANIAL/ORBITS/SINUSES: No abnormality of the visualized intracranial compartment or orbits. Visualized paranasal sinuses and mastoid air cells are clear.    OTHER: No destructive osseous lesion. The included lung apices are clear.      Impression    IMPRESSION:   1.  Normal soft tissue neck CT.   Basic metabolic panel     Status: Abnormal   Result Value Ref Range    Sodium 136 133 - 144 mmol/L    Potassium 3.8 3.4 - 5.3 mmol/L    Chloride 102 94 - 109 mmol/L    Carbon Dioxide (CO2) 31 20 - 32 mmol/L    Anion Gap 3 3 - 14 mmol/L    Urea Nitrogen 16 7 - 30 mg/dL    Creatinine 0.83 0.66 - 1.25 mg/dL    Calcium 8.6 8.5 - 10.1 mg/dL    Glucose 147 (H) 70 - 99 mg/dL    GFR Estimate >90 >60 mL/min/1.73m2   CBC with platelets and differential     Status: None   Result Value Ref Range    WBC Count 5.5 4.0 - 11.0 10e3/uL    RBC Count 5.18 4.40 - 5.90 10e6/uL    Hemoglobin 15.2 13.3 - 17.7 g/dL    Hematocrit 44.2 40.0 - 53.0 %    MCV 85 78 - 100 fL    MCH 29.3 26.5 - 33.0 pg    MCHC 34.4 31.5 - 36.5 g/dL    RDW 12.8 10.0 - 15.0 %    Platelet Count 162 150 - 450 10e3/uL    % Neutrophils 53 %    % Lymphocytes 35 %    % Monocytes 9 %    % Eosinophils 2 %    % Basophils 1 %    % Immature Granulocytes 0 %    NRBCs per 100 WBC 0 <1 /100    Absolute Neutrophils 3.0 1.6 - 8.3 10e3/uL    Absolute Lymphocytes 1.9 0.8 - 5.3 10e3/uL    Absolute Monocytes 0.5 0.0 - 1.3 10e3/uL    Absolute Eosinophils 0.1 0.0 - 0.7 10e3/uL    Absolute Basophils 0.0 0.0 - 0.2 10e3/uL    Absolute Immature Granulocytes 0.0 <=0.0 10e3/uL    Absolute NRBCs 0.0 10e3/uL   CBC with platelets differential     Status: None    Narrative    The following orders were created for panel order CBC with platelets differential.  Procedure                               Abnormality         Status                     ---------                               -----------         ------                      CBC with platelets and d...[[id number]]                      Final result                 Please view results for these tests on the individual orders.     Medications   ipratropium - albuterol 0.5 mg/2.5 mg/3 mL (DUONEB) neb solution 3 mL (3 mLs Nebulization Given 10/23/21 8158)   sodium chloride 0.9 % bag 500mL for CT scan flush use (50 mLs As instructed Given 10/23/21 0604)   iopamidol (ISOVUE-370) solution 100 mL (80 mLs Intravenous Given 10/23/21 9547)        Assessments & Plan (with Medical Decision Making)   Patient presents for evaluation of shortness of breath, foreign body sensation in throat, which has been present for 8 years.  Worse tonight.  Previously diagnosed with pharyngeal irritation/chronic throat clearing syndrome.  Follows with ENT and speech pathology.    On arrival, patient satting 97% on room air.  No respiratory distress.  Does have a mildly muffled voice.  Overall does not appear toxic.  No obvious airway swelling.  Tolerating secretions.    Differential diagnosis includes sleep apnea, exacerbation of chronic throat clearing syndrome/laryngeal irritation, epiglottitis, soft tissue mass.    Labs are unremarkable.  No leukocytosis or fever to suggest major infectious etiology.    CT negative for epiglottitis, mass, or other soft tissue lesion.  Airway is widely patent.  Patient has not developed any hypoxia or respiratory distress while in the ED.  Symptoms likely secondary to exacerbation of his chronic throat clearing and or larynx irritation syndrome.  Could also have sleep apnea.  Discussed follow-up with ENT/speech therapy for continued treatment of chronic issue and PCP for sleep study        I have reviewed the nursing notes. I have reviewed the findings, diagnosis, plan and need for follow up with the patient.    New Prescriptions    No medications on file       Final diagnoses:   Sleep apnea, unspecified type   Chronic throat clearing       --  Ben Dick DO  [facility]  [facility] EMERGENCY DEPARTMENT  10/23/2021     Ben Dick DO  10/23/21 0625

## 2021-10-28 ENCOUNTER — HOSPITAL ENCOUNTER (EMERGENCY)
Facility: CLINIC | Age: 41
Discharge: HOME | End: 2021-10-28
Attending: EMERGENCY MEDICINE | Admitting: EMERGENCY MEDICINE
Payer: COMMERCIAL

## 2021-10-28 ENCOUNTER — APPOINTMENT (OUTPATIENT)
Dept: GENERAL RADIOLOGY | Facility: CLINIC | Age: 41
End: 2021-10-28
Attending: EMERGENCY MEDICINE
Payer: COMMERCIAL

## 2021-10-28 VITALS
HEIGHT: 65 IN | OXYGEN SATURATION: 100 % | TEMPERATURE: 98.9 F | DIASTOLIC BLOOD PRESSURE: 68 MMHG | SYSTOLIC BLOOD PRESSURE: 148 MMHG | HEART RATE: 81 BPM | WEIGHT: 185.3 LBS | BODY MASS INDEX: 30.87 KG/M2 | RESPIRATION RATE: 16 BRPM

## 2021-10-28 DIAGNOSIS — Z11.52 ENCOUNTER FOR SCREENING LABORATORY TESTING FOR SEVERE ACUTE RESPIRATORY SYNDROME CORONAVIRUS 2 (SARS-COV-2): ICD-10-CM

## 2021-10-28 DIAGNOSIS — R05.9 COUGH: ICD-10-CM

## 2021-10-28 LAB
FLUAV RNA SPEC QL NAA+PROBE: NEGATIVE
FLUBV RNA RESP QL NAA+PROBE: NEGATIVE
GLUCOSE BLDC GLUCOMTR-MCNC: 229 MG/DL (ref 70–99)
RSV RNA SPEC NAA+PROBE: NEGATIVE
SARS-COV-2 RNA RESP QL NAA+PROBE: NEGATIVE

## 2021-10-28 PROCEDURE — 87637 SARSCOV2&INF A&B&RSV AMP PRB: CPT | Performed by: EMERGENCY MEDICINE

## 2021-10-28 PROCEDURE — 71046 X-RAY EXAM CHEST 2 VIEWS: CPT

## 2021-10-28 PROCEDURE — C9803 HOPD COVID-19 SPEC COLLECT: HCPCS

## 2021-10-28 PROCEDURE — 99284 EMERGENCY DEPT VISIT MOD MDM: CPT | Performed by: EMERGENCY MEDICINE

## 2021-10-28 PROCEDURE — 99284 EMERGENCY DEPT VISIT MOD MDM: CPT | Mod: 25

## 2021-10-28 RX ORDER — BENZONATATE 100 MG/1
200 CAPSULE ORAL 2 TIMES DAILY PRN
Qty: 30 CAPSULE | Refills: 0 | Status: SHIPPED | OUTPATIENT
Start: 2021-10-28 | End: 2022-05-26

## 2021-10-28 ASSESSMENT — ENCOUNTER SYMPTOMS
COLOR CHANGE: 0
COUGH: 1
VOMITING: 0
ARTHRALGIAS: 0
NAUSEA: 0
EYE REDNESS: 0
SHORTNESS OF BREATH: 0
NECK STIFFNESS: 0
HEADACHES: 0
POLYDIPSIA: 0
NECK PAIN: 0
ADENOPATHY: 0
CHILLS: 0
BRUISES/BLEEDS EASILY: 0
FEVER: 0
ABDOMINAL PAIN: 0
CONFUSION: 0
DIFFICULTY URINATING: 0

## 2021-10-28 ASSESSMENT — MIFFLIN-ST. JEOR: SCORE: 1677.4

## 2021-10-29 NOTE — ED PROVIDER NOTES
ED Provider Note  [facility]      History     Chief Complaint   Patient presents with     Cough     feels pain in center of chest when coughing only; has had cough for 4 days with pain; denies fever or chills; denies SOB or dyspnea     The history is provided by the patient.   Cough  Associated symptoms: no chest pain, no chills, no fever, no headaches and no shortness of breath      Casi Villatoro is a 41 year old male with a past medical history significant for type 2 diabetes and hyperlipidemia who comes to the ED for an evaluation of a cough and chest pain.  Patient reports nonproductive, intermittent cough for the last several days associated with diffuse, sharp, nonradiating, mild chest pain.  He has no pain or shortness of breath when he is not coughing.  Patient denies fever or chills.  Denies shortness of breath or dyspnea.  He denies having COVID-19 infection and he states he is not vaccinated for COVID-19.  He is not a smoker.  No other concerns or complaints.    Past Medical History  Past Medical History:   Diagnosis Date     BMI 31.0-31.9,adult 6/20/2013     Diabetes (H)      Hyperlipidemia LDL goal <100 4/16/2013     Past Surgical History:   Procedure Laterality Date     NO HISTORY OF SURGERY  2013     benzonatate (TESSALON) 100 MG capsule  blood glucose (NO BRAND SPECIFIED) test strip  blood glucose monitoring (ACCU-CHEK MULTICLIX) lancets  famotidine (PEPCID) 20 MG tablet  insulin aspart (NOVOLOG PEN) 100 UNIT/ML pen  insulin glargine (LANTUS SOLOSTAR) 100 UNIT/ML pen  insulin glargine (LANTUS SOLOSTAR) 100 UNIT/ML pen  insulin pen needle (32G X 4 MM) 32G X 4 MM miscellaneous      No Known Allergies  Family History  Family History   Problem Relation Age of Onset     Cancer - colorectal No family hx of      Prostate Cancer No family hx of      Social History   Social History     Tobacco Use     Smoking status: Never Smoker     Smokeless tobacco: Never Used   Substance Use  "Topics     Alcohol use: No     Drug use: No      Past medical history, past surgical history, medications, allergies, family history, and social history were reviewed with the patient. No additional pertinent items.       Review of Systems   Constitutional: Negative for chills and fever.   HENT: Negative for congestion.    Eyes: Negative for redness.   Respiratory: Positive for cough. Negative for shortness of breath.    Cardiovascular: Negative for chest pain.   Gastrointestinal: Negative for abdominal pain, nausea and vomiting.   Endocrine: Negative for polydipsia and polyuria.   Genitourinary: Negative for difficulty urinating.   Musculoskeletal: Negative for arthralgias, neck pain and neck stiffness.   Skin: Negative for color change.   Neurological: Negative for headaches.   Hematological: Negative for adenopathy. Does not bruise/bleed easily.   Psychiatric/Behavioral: Negative for confusion.   All other systems reviewed and are negative.    A complete review of systems was performed with pertinent positives and negatives noted in the HPI, and all other systems negative.    Physical Exam   BP: 132/73  Pulse: 97  Temp: 98.9  F (37.2  C)  Resp: 18  Height: 165.1 cm (5' 5\")  Weight: 84.1 kg (185 lb 4.8 oz)  SpO2: 96 %  Physical Exam  Vitals and nursing note reviewed.   Constitutional:       General: He is not in acute distress.     Appearance: Normal appearance. He is not ill-appearing or diaphoretic.   HENT:      Head: Normocephalic and atraumatic.   Eyes:      General: No scleral icterus.     Extraocular Movements: Extraocular movements intact.      Conjunctiva/sclera: Conjunctivae normal.   Cardiovascular:      Rate and Rhythm: Normal rate.      Pulses: Normal pulses.      Heart sounds: Normal heart sounds.   Pulmonary:      Effort: Pulmonary effort is normal. No respiratory distress.      Breath sounds: Normal breath sounds. No wheezing, rhonchi or rales.   Chest:      Chest wall: No tenderness.   Abdominal: "      Palpations: Abdomen is soft.      Tenderness: There is no abdominal tenderness. There is no guarding or rebound.   Musculoskeletal:         General: No tenderness. Normal range of motion.      Cervical back: Normal range of motion and neck supple.   Skin:     General: Skin is warm.      Findings: No rash.   Neurological:      General: No focal deficit present.      Mental Status: He is alert and oriented to person, place, and time.      Cranial Nerves: No cranial nerve deficit.      Coordination: Coordination normal.   Psychiatric:         Mood and Affect: Mood normal.         Behavior: Behavior normal.         Thought Content: Thought content normal.         Judgment: Judgment normal.         ED Course      Procedures              Results for orders placed or performed during the hospital encounter of 10/28/21   XR Chest 2 Views     Status: None    Narrative    EXAM: XR CHEST 2 VW  LOCATION: [facility]  DATE/TIME: 10/28/2021 6:54 PM    INDICATION: cough  COMPARISON: 08/06/2015      Impression    IMPRESSION: Negative chest.   Symptomatic Influenza A/B & SARS-CoV2 (COVID-19) Virus PCR Multiplex Nasopharyngeal     Status: Normal    Specimen: Nasopharyngeal; Swab   Result Value Ref Range    Influenza A target Negative Negative    Influenza B target Negative Negative    RSV target Negative Negative    SARS CoV2 PCR Negative Negative    Narrative    Testing was performed using the Xpert Xpress CoV2/Flu/RSV Assay on the [employer] GeneXpert Instrument. This test should be ordered for the detection of SARS-CoV-2 and influenza viruses in individuals who meet clinical and/or epidemiological criteria. Test performance is unknown in asymptomatic patients. This test is for in vitro diagnostic use under the FDA EUA for laboratories certified under CLIA to perform high or moderate complexity testing. This test has not been FDA cleared or approved. A negative result does not rule out  the presence of PCR inhibitors in the specimen or target RNA in concentration below the limit of detection for the assay. If only one viral target is positive but coinfection with multiple targets is suspected, the sample should be re-tested with another FDA cleared, approved, or authorized test, if coinfection would change clinical management. This test was validated by the [facility]. These laboratories are certified under the Clinical  Laboratory Improvement Amendments of 1988 (CLIA-88) as qualified to perform high complexity laboratory testing.   Glucose by meter     Status: Abnormal   Result Value Ref Range    GLUCOSE BY METER POCT 229 (H) 70 - 99 mg/dL     Medications - No data to display     Assessments & Plan (with Medical Decision Making)   41-year-old man presenting with nonproductive cough.  Differential diagnosis: Pneumonia, viral illness, COVID-19 infection.    After thorough history and physical exam patient appears to be no acute distress.  Chest x-ray was obtained which I reviewed along with the radiology report; no evidence of infiltrates to suggest acute pneumonia.  COVID-19 test resulted negative.  Influenza test is negative.  Patient stable for discharge with Tessalon Perls prescription and outpatient primary care follow-up.  He agrees with the plan he agrees to return to the emergency department if his symptoms worsen or he develops a fever.    I have reviewed the nursing notes. I have reviewed the findings, diagnosis, plan and need for follow up with the patient.    Discharge Medication List as of 10/28/2021  9:33 PM      START taking these medications    Details   benzonatate (TESSALON) 100 MG capsule Take 2 capsules (200 mg) by mouth 2 times daily as needed for cough, Disp-30 capsule, R-0, Local Print             Final diagnoses:   Cough       --  Reji Bain MD  [facility] EMERGENCY DEPARTMENT  10/28/2021     Reji Bain MD  10/28/21 7060

## 2021-10-29 NOTE — DISCHARGE INSTRUCTIONS
Please make an appointment to follow [facility] with [facility] (phone: [phone]) in 1-2 days to establish care and for further evaluation and recommendations.  If your symptoms worsen or you develop a fever 100.4  F or higher please come back to the emergency department.

## 2022-05-26 ENCOUNTER — OFFICE VISIT (OUTPATIENT)
Dept: INTERNAL MEDICINE | Facility: CLINIC | Age: 42
End: 2022-05-26
Payer: COMMERCIAL

## 2022-05-26 VITALS
OXYGEN SATURATION: 98 % | BODY MASS INDEX: 30.22 KG/M2 | SYSTOLIC BLOOD PRESSURE: 134 MMHG | RESPIRATION RATE: 15 BRPM | WEIGHT: 181.6 LBS | HEART RATE: 70 BPM | DIASTOLIC BLOOD PRESSURE: 70 MMHG

## 2022-05-26 DIAGNOSIS — E78.1 HIGH TRIGLYCERIDES: ICD-10-CM

## 2022-05-26 DIAGNOSIS — Z79.4 TYPE 2 DIABETES MELLITUS WITH MICROALBUMINURIA, WITH LONG-TERM CURRENT USE OF INSULIN (H): ICD-10-CM

## 2022-05-26 DIAGNOSIS — Z00.00 ROUTINE HISTORY AND PHYSICAL EXAMINATION OF ADULT: ICD-10-CM

## 2022-05-26 DIAGNOSIS — E11.29 TYPE 2 DIABETES MELLITUS WITH MICROALBUMINURIA, WITH LONG-TERM CURRENT USE OF INSULIN (H): ICD-10-CM

## 2022-05-26 DIAGNOSIS — R80.9 TYPE 2 DIABETES MELLITUS WITH MICROALBUMINURIA, WITH LONG-TERM CURRENT USE OF INSULIN (H): ICD-10-CM

## 2022-05-26 DIAGNOSIS — Z11.59 NEED FOR HEPATITIS C SCREENING TEST: Primary | ICD-10-CM

## 2022-05-26 LAB
ALBUMIN SERPL-MCNC: 3.9 G/DL (ref 3.5–5)
ALP SERPL-CCNC: 64 U/L (ref 45–120)
ALT SERPL W P-5'-P-CCNC: 52 U/L (ref 0–45)
ANION GAP SERPL CALCULATED.3IONS-SCNC: 11 MMOL/L (ref 5–18)
AST SERPL W P-5'-P-CCNC: 28 U/L (ref 0–40)
BILIRUB SERPL-MCNC: 0.8 MG/DL (ref 0–1)
BUN SERPL-MCNC: 12 MG/DL (ref 8–22)
CALCIUM SERPL-MCNC: 9.2 MG/DL (ref 8.5–10.5)
CHLORIDE BLD-SCNC: 101 MMOL/L (ref 98–107)
CHOLEST SERPL-MCNC: 159 MG/DL
CO2 SERPL-SCNC: 26 MMOL/L (ref 22–31)
CREAT SERPL-MCNC: 0.78 MG/DL (ref 0.7–1.3)
CREAT UR-MCNC: 53 MG/DL
FASTING STATUS PATIENT QL REPORTED: YES
GFR SERPL CREATININE-BSD FRML MDRD: >90 ML/MIN/1.73M2
GLUCOSE BLD-MCNC: 158 MG/DL (ref 70–125)
HBA1C MFR BLD: 7.8 % (ref 0–5.6)
HDLC SERPL-MCNC: 31 MG/DL
LDLC SERPL CALC-MCNC: 76 MG/DL
LDLC SERPL CALC-MCNC: ABNORMAL MG/DL
MICROALBUMIN UR-MCNC: <0.5 MG/DL (ref 0–1.99)
MICROALBUMIN/CREAT UR: NORMAL MG/G{CREAT}
POTASSIUM BLD-SCNC: 4.3 MMOL/L (ref 3.5–5)
PROT SERPL-MCNC: 7.3 G/DL (ref 6–8)
SODIUM SERPL-SCNC: 138 MMOL/L (ref 136–145)
TRIGL SERPL-MCNC: 465 MG/DL

## 2022-05-26 PROCEDURE — 80053 COMPREHEN METABOLIC PANEL: CPT | Performed by: INTERNAL MEDICINE

## 2022-05-26 PROCEDURE — 83036 HEMOGLOBIN GLYCOSYLATED A1C: CPT | Performed by: INTERNAL MEDICINE

## 2022-05-26 PROCEDURE — 86803 HEPATITIS C AB TEST: CPT | Performed by: INTERNAL MEDICINE

## 2022-05-26 PROCEDURE — 82043 UR ALBUMIN QUANTITATIVE: CPT | Performed by: INTERNAL MEDICINE

## 2022-05-26 PROCEDURE — 80061 LIPID PANEL: CPT | Performed by: INTERNAL MEDICINE

## 2022-05-26 PROCEDURE — 36415 COLL VENOUS BLD VENIPUNCTURE: CPT | Performed by: INTERNAL MEDICINE

## 2022-05-26 PROCEDURE — 99396 PREV VISIT EST AGE 40-64: CPT | Performed by: INTERNAL MEDICINE

## 2022-05-26 PROCEDURE — 83721 ASSAY OF BLOOD LIPOPROTEIN: CPT | Mod: 59 | Performed by: INTERNAL MEDICINE

## 2022-05-26 ASSESSMENT — ENCOUNTER SYMPTOMS
CHILLS: 0
DYSURIA: 0
MYALGIAS: 0
HEMATOCHEZIA: 0
SORE THROAT: 0
HEMATURIA: 0
SHORTNESS OF BREATH: 0
WEAKNESS: 0
DIZZINESS: 0
FEVER: 0
CONSTIPATION: 0
PARESTHESIAS: 0
ARTHRALGIAS: 0
EYE PAIN: 0
NAUSEA: 0
NERVOUS/ANXIOUS: 0
HEADACHES: 0
JOINT SWELLING: 0
DIARRHEA: 0
HEARTBURN: 0
ABDOMINAL PAIN: 0
PALPITATIONS: 0
COUGH: 0
FREQUENCY: 0

## 2022-05-26 ASSESSMENT — PAIN SCALES - GENERAL: PAINLEVEL: NO PAIN (0)

## 2022-05-26 NOTE — PATIENT INSTRUCTIONS
Diabetes can affect eyes and fett and kidneys . We do blood tests for the kidneys   You do need eye and foot exam every year    We do recommedn the covid vaccine in all patients

## 2022-05-26 NOTE — PROGRESS NOTES
SUBJECTIVE:   CC: Casi Villatoro is an 42 year old male who presents for preventative health visit.   He is being seen with through the help of an .  He speaks [place]   He has history of type 2 diabetes on insulin.  He is unable to tell me if he has tried any other medications.  He is not checking his blood sugars regularly he is currently taking lantus every day  42 units   He is otherwise feeling well.  He has no past history of ischemic heart disease, hypertension he is not a smoker and does not drink alcohol  Patient has been advised of split billing requirements and indicates understanding: Yes  Healthy Habits:     Getting at least 3 servings of Calcium per day:  Yes    Bi-annual eye exam:  NO    Dental care twice a year:  Yes    Sleep apnea or symptoms of sleep apnea:  None    Diet:  Diabetic    Frequency of exercise:  6-7 days/week    Duration of exercise:  Greater than 60 minutes    Taking medications regularly:  Yes    Medication side effects:  None    PHQ-2 Total Score: 0    Additional concerns today:  No        Patient Active Problem List   Diagnosis     CARDIOVASCULAR SCREENING; LDL GOAL LESS THAN 100     Speaks [place], requires      BMI 30.0-30.9,adult     Type 2 diabetes mellitus with microalbuminuria, with long-term current use of insulin (H)     Low HDL (under 40)     High triglycerides         Today's PHQ-2 Score:   PHQ-2 ( 1999 Pfizer) 5/26/2022   Q1: Little interest or pleasure in doing things 0   Q2: Feeling down, depressed or hopeless 0   PHQ-2 Score 0   PHQ-2 Total Score (12-17 Years)- Positive if 3 or more points; Administer PHQ-A if positive -   Q1: Little interest or pleasure in doing things Not at all   Q2: Feeling down, depressed or hopeless Not at all   PHQ-2 Score 0       Abuse: Current or Past(Physical, Sexual or Emotional)- NO  Do you feel safe in your environment? Yes     No, advance care planning information given to patient to review.  Patient declined advance care  planning discussion at this time.    Social History     Tobacco Use     Smoking status: Never Smoker     Smokeless tobacco: Never Used   Substance Use Topics     Alcohol use: No         Alcohol Use 5/26/2022   Prescreen: >3 drinks/day or >7 drinks/week? No   Prescreen: >3 drinks/day or >7 drinks/week? -       Last PSA: No results found for: PSA    Reviewed orders with patient. Reviewed health maintenance and updated orders accordingly - Yes      Reviewed and updated as needed this visit by clinical staff   Tobacco  Allergies  Meds                Reviewed and updated as needed this visit by Provider                   Past Medical History:   Diagnosis Date     BMI 31.0-31.9,adult 6/20/2013     Diabetes (H)      Hyperlipidemia LDL goal <100 4/16/2013      Past Surgical History:   Procedure Laterality Date     NO HISTORY OF SURGERY  2013       Review of Systems  CONSTITUTIONAL: NEGATIVE for fever, chills, change in weight  INTEGUMENTARY/SKIN: NEGATIVE for worrisome rashes, moles or lesions  EYES: NEGATIVE for vision changes or irritation  ENT: NEGATIVE for ear, mouth and throat problems  RESP: NEGATIVE for significant cough or SOB  CV: NEGATIVE for chest pain, palpitations or peripheral edema  GI: NEGATIVE for nausea, abdominal pain, heartburn, or change in bowel habits   male: negative for dysuria, hematuria, decreased urinary stream, erectile dysfunction, urethral discharge  MUSCULOSKELETAL: NEGATIVE for significant arthralgias or myalgia  NEURO: NEGATIVE for weakness, dizziness or paresthesias  PSYCHIATRIC: NEGATIVE for changes in mood or affect    OBJECTIVE:   /70 (BP Location: Left arm, Patient Position: Sitting, Cuff Size: Adult Large)   Pulse 70   Resp 15   Wt 82.4 kg (181 lb 9.6 oz)   SpO2 98%   BMI 30.22 kg/m      Physical Exam  GENERAL: healthy, alert and no distress  EYES: Eyes grossly normal to inspection, PERRL and conjunctivae and sclerae normal  HENT: ear canals and TM's normal, nose and  "mouth without ulcers or lesions  NECK: no adenopathy, no asymmetry, masses, or scars and thyroid normal to palpation  RESP: lungs clear to auscultation - no rales, rhonchi or wheezes  CV: regular rate and rhythm, normal S1 S2, no S3 or S4, no murmur, click or rub, no peripheral edema and peripheral pulses strong  ABDOMEN: soft, nontender, no hepatosplenomegaly, no masses and bowel sounds normal  MS: no gross musculoskeletal defects noted, no edema  SKIN: no suspicious lesions or rashes  NEURO: Normal strength and tone, mentation intact and speech normal  PSYCH: mentation appears normal, affect normal/bright        ASSESSMENT/PLAN:       1. Need for hepatitis C screening test    - Hepatitis C Screen Reflex to HCV RNA Quant and Genotype; Future    2. Routine history and physical examination of adult    - OPTOMETRY REFERRAL; Future  - HEMOGLOBIN A1C; Future  - Albumin Random Urine Quantitative with Creat Ratio; Future  - Comprehensive metabolic panel; Future  - Lipid panel reflex to direct LDL Fasting; Future    3. Type 2 diabetes mellitus with microalbuminuria, with long-term current use of insulin (H)    He would be a good candidate for metformin.  I do believe Victoza might be the next option language issues may be a barrier.  I strongly recommend him seeing the diabetic teaching nurse.  He is normotensive without needing an antihypertensive.  We will check his microalbumin.  Consider low-dose ACE inhibitor at next visit.  He should see optometry explained why he needs to get a diabetic eye exam.  Will defer adding statin and aspirin given that he is under 50.   Colonoscopy will start at age 46  4. High triglycerides      Declines covid  vaccines   He did get pneumonia shot   Up  date tdap       COUNSELING:   Reviewed preventive health counseling, as reflected in patient instructions    Estimated body mass index is 30.22 kg/m  as calculated from the following:    Height as of 10/28/21: 1.651 m (5' 5\").    Weight as " of this encounter: 82.4 kg (181 lb 9.6 oz).         He reports that he has never smoked. He has never used smokeless tobacco.      Counseling Resources:  ATP IV Guidelines  Pooled Cohorts Equation Calculator  FRAX Risk Assessment  ICSI Preventive Guidelines  Dietary Guidelines for Americans, 2010  USDA's MyPlate  ASA Prophylaxis  Lung CA Screening    Renu Roque MD  [facility]

## 2022-05-26 NOTE — LETTER
May 27, 2022      Casi Villatoro  [address]  [place] [address]        Dear ,    We are writing to inform you of your test results.        Resulted Orders   Hepatitis C Screen Reflex to HCV RNA Quant and Genotype   Result Value Ref Range    Hepatitis C Antibody Nonreactive Nonreactive    Narrative    Assay performance characteristics have not been established for newborns, infants, and children.   HEMOGLOBIN A1C   Result Value Ref Range    Hemoglobin A1C 7.8 (H) 0.0 - 5.6 %      Comment:      Normal <5.7%   Prediabetes 5.7-6.4%    Diabetes 6.5% or higher     Note: Adopted from ADA consensus guidelines.   Albumin Random Urine Quantitative with Creat Ratio   Result Value Ref Range    Microalbumin Urine mg/dL <0.50 0.00 - 1.99 mg/dL    Creatinine Urine mg/dL 53 mg/dL    Microalbumin Urine mg/g Cr        Comment:      Unable to calculate:  Urine creatinine or microalbumin value below detectable level    Narrative    Microalbumin, Random Urine   <2.0 mg/dL . . . . . . . . Normal   3.0-30.0 mg/dL . . . . . . Microalbuminuria   >30.0 mg/dL . . . . . .  . Clinical Proteinuria     Microalbumin/Creatinine Ratio, Random Urine   <20 mg/g . . . . .. . . . Normal    mg/g . . . . . . . Microalbuminuria   >300 mg/g . . . . . . . . Clinical Proteinuria   Comprehensive metabolic panel   Result Value Ref Range    Sodium 138 136 - 145 mmol/L    Potassium 4.3 3.5 - 5.0 mmol/L    Chloride 101 98 - 107 mmol/L    Carbon Dioxide (CO2) 26 22 - 31 mmol/L    Anion Gap 11 5 - 18 mmol/L    Urea Nitrogen 12 8 - 22 mg/dL    Creatinine 0.78 0.70 - 1.30 mg/dL    Calcium 9.2 8.5 - 10.5 mg/dL    Glucose 158 (H) 70 - 125 mg/dL    Alkaline Phosphatase 64 45 - 120 U/L    AST 28 0 - 40 U/L    ALT 52 (H) 0 - 45 U/L    Protein Total 7.3 6.0 - 8.0 g/dL    Albumin 3.9 3.5 - 5.0 g/dL    Bilirubin Total 0.8 0.0 - 1.0 mg/dL    GFR Estimate >90 >60 mL/min/1.73m2      Comment:      Effective December 21, 2021 eGFRcr in adults is calculated  using the 2021 CKD-EPI creatinine equation which includes age and gender (Tommy et al., NEJ, DOI: 10.1056/DUWQfh8520578)   Lipid panel reflex to direct LDL Fasting   Result Value Ref Range    Cholesterol 159 <=199 mg/dL    Triglycerides 465 (H) <=149 mg/dL    Direct Measure HDL 31 (L) >=40 mg/dL      Comment:      HDL Cholesterol Reference Range:     0-2 years:   No reference ranges established for patients under 2 years old  at [facility] for lipid analytes.    2-8 years:  Greater than 45 mg/dL     18 years and older:   Female: Greater than or equal to 50 mg/dL   Male:   Greater than or equal to 40 mg/dL    LDL Cholesterol Calculated        Comment:      Cannot estimate LDL when triglyceride exceeds 400 mg/dL    Patient Fasting > 8hrs? Yes    LDL cholesterol direct   Result Value Ref Range    LDL Cholesterol Direct 76 <=129 mg/dL     The electrolytes, kidney tests are all normal .   One of the liver tests is slightly high but it can be monitored and is nothing to worry about.  The triglycerides are high and your cholesterol profile.  These are fats that are strongly affected by diet and reducing fats in your diet will reduce your triglycerides.  Your other bad cholesterol or LDL is normal.  The Aic test is a diabetic test . It is the average blood sugar over three months . Good control is when it is under 7 . Ideally it should be 6.5 or less. Your level is   is worse than it was before.  I am sending in metformin .       If you have any questions or concerns, please call the clinic at the number listed above.       Sincerely,      Renu Roque MD

## 2022-05-27 LAB — HCV AB SERPL QL IA: NONREACTIVE

## 2022-05-28 NOTE — RESULT ENCOUNTER NOTE
He will need an [facility]  for this call, please tell patient that the diabetic test is much higher than it was before.  He should keep his appointment with the diabetic teaching nurse so that he can control his diet better.  At this stage I would like to add metformin as it appears he is never taken that medication before.  It is a tablet that helps insulin work better.  Biggest side effect may be diarrhea.  But if it is tolerated it is a very good medicine.  I am going to start him on 1000 mg once a day and then if he tolerates that he should go up to 1000 mg twice daily.  Also his cholesterol was slightly high.  I would like him to work on diet before starting him on the medication instead of 6 months he needs a 3-month follow-up.  I am also going to send him a result letter.  Please tell him if he has questions that I will answer them on return.  This should not be sent to covering provider unless they are urgent

## 2022-05-31 ENCOUNTER — APPOINTMENT (OUTPATIENT)
Dept: INTERPRETER SERVICES | Facility: CLINIC | Age: 42
End: 2022-05-31
Payer: COMMERCIAL

## 2022-05-31 ENCOUNTER — NURSE TRIAGE (OUTPATIENT)
Dept: NURSING | Facility: CLINIC | Age: 42
End: 2022-05-31
Payer: COMMERCIAL

## 2022-05-31 NOTE — TELEPHONE ENCOUNTER
Patient is calling back for results.  FNA lined up [place] .  FNA went over all labs with patient.  FNA advised patient that he can view his results on my chart and also results have been mailed to patient.    COVID 19 Nurse Triage Plan/Patient Instructions    Please be aware that novel coronavirus (COVID-19) may be circulating in the community. If you develop symptoms such as fever, cough, or SOB or if you have concerns about the presence of another infection including coronavirus (COVID-19), please contact your health care provider or visit [email or web address].     Disposition/Instructions    Home care recommended. Follow home care protocol based instructions.    Thank you for taking steps to prevent the spread of this virus.  o Limit your contact with others.  o Wear a simple mask to cover your cough.  o Wash your hands well and often.    Resources    [facility]: About COVID-19: [email or web address]    CDC: What to Do If You're Sick: www.cdc.gov/coronavirus/2019-ncov/about/steps-when-sick.html    CDC: Ending Home Isolation: www.cdc.gov/coronavirus/2019-ncov/hcp/disposition-in-home-patients.html     CDC: Caring for Someone: www.cdc.gov/coronavirus/2019-ncov/if-you-are-sick/care-for-someone.html     [facility]: Interim Guidance for Hospital Discharge to Home: www.health.[facility]/diseases/coronavirus/hcp/hospdischarge.pdf    [facility] clinical trials (COVID-19 research studies): [email or web address]-[email or web address]-trials     Below are the COVID-19 hotlines at the [facility] ([facility]). Interpreters are available.   o For health questions: Call 302-097-5305 or 1-633.649.9684 (7 a.m. to 7 p.m.)  o For questions about schools and childcare: Call 179-809-9845 or 1-775.373.5390 (7 a.m. to 7 p.m.)

## 2022-06-06 ENCOUNTER — APPOINTMENT (OUTPATIENT)
Dept: INTERPRETER SERVICES | Facility: CLINIC | Age: 42
End: 2022-06-06
Payer: COMMERCIAL

## 2022-08-10 ENCOUNTER — TELEPHONE (OUTPATIENT)
Dept: FAMILY MEDICINE | Facility: CLINIC | Age: 42
End: 2022-08-10

## 2022-08-10 NOTE — TELEPHONE ENCOUNTER
Reason for Call:  Form, our goal is to have forms completed with 72 hours, however, some forms may require a visit or additional information.    Type of letter, form or note:  employer    Who is the form from?: Patient    Where did the form come from: Patient or family brought in       What clinic location was the form placed at?: [facility]    Where the form was placed: Dr. Dhillon Box/Folder    What number is listed as a contact on the form?: [phone]       Additional comments: Please fill out pt. Will  call # when ready.    Call taken on 8/10/2022 at 3:27 PM by MARCOS FORTE

## (undated) RX ORDER — LIDOCAINE HYDROCHLORIDE 20 MG/ML
SOLUTION OROPHARYNGEAL
Status: DISPENSED
Start: 2021-06-17